# Patient Record
Sex: FEMALE | Race: WHITE | NOT HISPANIC OR LATINO
[De-identification: names, ages, dates, MRNs, and addresses within clinical notes are randomized per-mention and may not be internally consistent; named-entity substitution may affect disease eponyms.]

---

## 2018-11-15 ENCOUNTER — APPOINTMENT (OUTPATIENT)
Dept: INTERNAL MEDICINE | Facility: CLINIC | Age: 68
End: 2018-11-15
Payer: MEDICARE

## 2018-11-15 VITALS
HEIGHT: 64 IN | HEART RATE: 84 BPM | TEMPERATURE: 98 F | BODY MASS INDEX: 23.73 KG/M2 | WEIGHT: 139 LBS | SYSTOLIC BLOOD PRESSURE: 132 MMHG | RESPIRATION RATE: 14 BRPM | DIASTOLIC BLOOD PRESSURE: 70 MMHG | OXYGEN SATURATION: 96 %

## 2018-11-15 DIAGNOSIS — Z00.00 ENCOUNTER FOR GENERAL ADULT MEDICAL EXAMINATION W/OUT ABNORMAL FINDINGS: ICD-10-CM

## 2018-11-15 DIAGNOSIS — Z87.891 PERSONAL HISTORY OF NICOTINE DEPENDENCE: ICD-10-CM

## 2018-11-15 DIAGNOSIS — M25.561 PAIN IN RIGHT KNEE: ICD-10-CM

## 2018-11-15 DIAGNOSIS — F51.01 PRIMARY INSOMNIA: ICD-10-CM

## 2018-11-15 DIAGNOSIS — Z78.9 OTHER SPECIFIED HEALTH STATUS: ICD-10-CM

## 2018-11-15 DIAGNOSIS — F41.9 ANXIETY DISORDER, UNSPECIFIED: ICD-10-CM

## 2018-11-15 DIAGNOSIS — F32.9 ANXIETY DISORDER, UNSPECIFIED: ICD-10-CM

## 2018-11-15 PROCEDURE — 99203 OFFICE O/P NEW LOW 30 MIN: CPT

## 2018-11-15 PROCEDURE — 36415 COLL VENOUS BLD VENIPUNCTURE: CPT

## 2018-11-15 PROCEDURE — 93000 ELECTROCARDIOGRAM COMPLETE: CPT

## 2018-11-15 NOTE — HISTORY OF PRESENT ILLNESS
[FreeTextEntry1] : right TKR [FreeTextEntry2] : 11/28 [FreeTextEntry3] : Dr Scales [FreeTextEntry4] : This is a 67 yo f with essential tremor, severe right knee pain with plan for right TKR.  \par Normally walks a lot but limited due to knee pain.  \par Takes both paxil and ambien daily.\par She is from [place] but here for treatment for her  who has pancreatic cancer

## 2018-11-16 LAB
ALBUMIN SERPL ELPH-MCNC: 4.6 G/DL
ALP BLD-CCNC: 74 U/L
ALT SERPL-CCNC: 8 U/L
ANION GAP SERPL CALC-SCNC: 13 MMOL/L
APPEARANCE: ABNORMAL
APTT BLD: 32.6 SEC
AST SERPL-CCNC: 14 U/L
BASOPHILS # BLD AUTO: 0.07 K/UL
BASOPHILS NFR BLD AUTO: 0.8 %
BILIRUB SERPL-MCNC: <0.2 MG/DL
BILIRUBIN URINE: NEGATIVE
BLOOD URINE: NEGATIVE
BUN SERPL-MCNC: 17 MG/DL
CALCIUM SERPL-MCNC: 9.7 MG/DL
CHLORIDE SERPL-SCNC: 104 MMOL/L
CO2 SERPL-SCNC: 24 MMOL/L
COLOR: YELLOW
CREAT SERPL-MCNC: 0.7 MG/DL
EOSINOPHIL # BLD AUTO: 0.46 K/UL
EOSINOPHIL NFR BLD AUTO: 5.4 %
GLUCOSE QUALITATIVE U: NEGATIVE
GLUCOSE SERPL-MCNC: 97 MG/DL
HCT VFR BLD CALC: 41.9 %
HGB BLD-MCNC: 13.4 G/DL
IMM GRANULOCYTES NFR BLD AUTO: 0.2 %
INR PPP: 0.91 RATIO
KETONES URINE: NEGATIVE
LEUKOCYTE ESTERASE URINE: NEGATIVE
LYMPHOCYTES # BLD AUTO: 1.99 K/UL
LYMPHOCYTES NFR BLD AUTO: 23.2 %
MAN DIFF?: NORMAL
MCHC RBC-ENTMCNC: 31.2 PG
MCHC RBC-ENTMCNC: 32 GM/DL
MCV RBC AUTO: 97.4 FL
MONOCYTES # BLD AUTO: 0.63 K/UL
MONOCYTES NFR BLD AUTO: 7.4 %
NEUTROPHILS # BLD AUTO: 5.4 K/UL
NEUTROPHILS NFR BLD AUTO: 63 %
NITRITE URINE: NEGATIVE
PH URINE: 6
PLATELET # BLD AUTO: 312 K/UL
POTASSIUM SERPL-SCNC: 4.2 MMOL/L
PROT SERPL-MCNC: 6.9 G/DL
PROTEIN URINE: 100
PT BLD: 10.2 SEC
RBC # BLD: 4.3 M/UL
RBC # FLD: 13.4 %
SODIUM SERPL-SCNC: 141 MMOL/L
SPECIFIC GRAVITY URINE: 1.02
UROBILINOGEN URINE: NEGATIVE
WBC # FLD AUTO: 8.57 K/UL

## 2018-11-21 ENCOUNTER — OUTPATIENT (OUTPATIENT)
Dept: OUTPATIENT SERVICES | Facility: HOSPITAL | Age: 68
LOS: 1 days | End: 2018-11-21
Payer: COMMERCIAL

## 2018-11-21 DIAGNOSIS — Z22.321 CARRIER OR SUSPECTED CARRIER OF METHICILLIN SUSCEPTIBLE STAPHYLOCOCCUS AUREUS: ICD-10-CM

## 2018-11-21 LAB
MRSA PCR RESULT.: NEGATIVE — SIGNIFICANT CHANGE UP
S AUREUS DNA NOSE QL NAA+PROBE: NEGATIVE — SIGNIFICANT CHANGE UP

## 2018-11-21 PROCEDURE — 87641 MR-STAPH DNA AMP PROBE: CPT

## 2018-11-28 ENCOUNTER — TRANSCRIPTION ENCOUNTER (OUTPATIENT)
Age: 68
End: 2018-11-28

## 2018-11-28 ENCOUNTER — RESULT REVIEW (OUTPATIENT)
Age: 68
End: 2018-11-28

## 2018-11-28 ENCOUNTER — INPATIENT (INPATIENT)
Facility: HOSPITAL | Age: 68
LOS: 1 days | Discharge: HOME | DRG: 470 | End: 2018-11-30
Payer: MEDICARE

## 2018-11-28 VITALS
RESPIRATION RATE: 16 BRPM | SYSTOLIC BLOOD PRESSURE: 141 MMHG | OXYGEN SATURATION: 100 % | TEMPERATURE: 97 F | DIASTOLIC BLOOD PRESSURE: 62 MMHG | HEART RATE: 76 BPM

## 2018-11-28 DIAGNOSIS — E78.5 HYPERLIPIDEMIA, UNSPECIFIED: ICD-10-CM

## 2018-11-28 DIAGNOSIS — F32.9 MAJOR DEPRESSIVE DISORDER, SINGLE EPISODE, UNSPECIFIED: ICD-10-CM

## 2018-11-28 DIAGNOSIS — Z90.89 ACQUIRED ABSENCE OF OTHER ORGANS: Chronic | ICD-10-CM

## 2018-11-28 DIAGNOSIS — M19.90 UNSPECIFIED OSTEOARTHRITIS, UNSPECIFIED SITE: ICD-10-CM

## 2018-11-28 DIAGNOSIS — Z90.49 ACQUIRED ABSENCE OF OTHER SPECIFIED PARTS OF DIGESTIVE TRACT: Chronic | ICD-10-CM

## 2018-11-28 DIAGNOSIS — M25.561 PAIN IN RIGHT KNEE: ICD-10-CM

## 2018-11-28 DIAGNOSIS — Z90.710 ACQUIRED ABSENCE OF BOTH CERVIX AND UTERUS: Chronic | ICD-10-CM

## 2018-11-28 DIAGNOSIS — M17.11 UNILATERAL PRIMARY OSTEOARTHRITIS, RIGHT KNEE: ICD-10-CM

## 2018-11-28 DIAGNOSIS — Z98.890 OTHER SPECIFIED POSTPROCEDURAL STATES: Chronic | ICD-10-CM

## 2018-11-28 PROCEDURE — 73560 X-RAY EXAM OF KNEE 1 OR 2: CPT | Mod: 26,RT

## 2018-11-28 RX ORDER — MORPHINE SULFATE 50 MG/1
4 CAPSULE, EXTENDED RELEASE ORAL EVERY 4 HOURS
Qty: 0 | Refills: 0 | Status: DISCONTINUED | OUTPATIENT
Start: 2018-11-28 | End: 2018-11-29

## 2018-11-28 RX ORDER — OXYCODONE AND ACETAMINOPHEN 5; 325 MG/1; MG/1
2 TABLET ORAL EVERY 6 HOURS
Qty: 0 | Refills: 0 | Status: DISCONTINUED | OUTPATIENT
Start: 2018-11-28 | End: 2018-11-29

## 2018-11-28 RX ORDER — ONDANSETRON 8 MG/1
4 TABLET, FILM COATED ORAL EVERY 6 HOURS
Qty: 0 | Refills: 0 | Status: DISCONTINUED | OUTPATIENT
Start: 2018-11-28 | End: 2018-11-29

## 2018-11-28 RX ORDER — ASPIRIN/CALCIUM CARB/MAGNESIUM 324 MG
325 TABLET ORAL
Qty: 0 | Refills: 0 | Status: DISCONTINUED | OUTPATIENT
Start: 2018-11-28 | End: 2018-11-30

## 2018-11-28 RX ORDER — OXYCODONE HYDROCHLORIDE 5 MG/1
10 TABLET ORAL EVERY 4 HOURS
Qty: 0 | Refills: 0 | Status: DISCONTINUED | OUTPATIENT
Start: 2018-11-28 | End: 2018-11-29

## 2018-11-28 RX ORDER — SODIUM CHLORIDE 9 MG/ML
1000 INJECTION, SOLUTION INTRAVENOUS
Qty: 0 | Refills: 0 | Status: DISCONTINUED | OUTPATIENT
Start: 2018-11-28 | End: 2018-11-30

## 2018-11-28 RX ORDER — ACETAMINOPHEN 500 MG
650 TABLET ORAL EVERY 6 HOURS
Qty: 0 | Refills: 0 | Status: DISCONTINUED | OUTPATIENT
Start: 2018-11-28 | End: 2018-11-30

## 2018-11-28 RX ORDER — BUPIVACAINE 13.3 MG/ML
20 INJECTION, SUSPENSION, LIPOSOMAL INFILTRATION ONCE
Qty: 0 | Refills: 0 | Status: DISCONTINUED | OUTPATIENT
Start: 2018-11-28 | End: 2018-11-30

## 2018-11-28 RX ORDER — CEFAZOLIN SODIUM 1 G
1000 VIAL (EA) INJECTION EVERY 8 HOURS
Qty: 0 | Refills: 0 | Status: COMPLETED | OUTPATIENT
Start: 2018-11-28 | End: 2018-11-29

## 2018-11-28 RX ORDER — ZOLPIDEM TARTRATE 10 MG/1
5 TABLET ORAL ONCE
Qty: 0 | Refills: 0 | Status: DISCONTINUED | OUTPATIENT
Start: 2018-11-28 | End: 2018-11-29

## 2018-11-28 RX ORDER — MORPHINE SULFATE 50 MG/1
4 CAPSULE, EXTENDED RELEASE ORAL
Qty: 0 | Refills: 0 | Status: DISCONTINUED | OUTPATIENT
Start: 2018-11-28 | End: 2018-11-29

## 2018-11-28 RX ORDER — OXYCODONE HYDROCHLORIDE 5 MG/1
5 TABLET ORAL EVERY 4 HOURS
Qty: 0 | Refills: 0 | Status: DISCONTINUED | OUTPATIENT
Start: 2018-11-28 | End: 2018-11-29

## 2018-11-28 RX ADMIN — OXYCODONE HYDROCHLORIDE 5 MILLIGRAM(S): 5 TABLET ORAL at 22:45

## 2018-11-28 RX ADMIN — OXYCODONE HYDROCHLORIDE 5 MILLIGRAM(S): 5 TABLET ORAL at 21:52

## 2018-11-28 RX ADMIN — OXYCODONE HYDROCHLORIDE 5 MILLIGRAM(S): 5 TABLET ORAL at 18:52

## 2018-11-28 RX ADMIN — Medication 100 MILLIGRAM(S): at 21:02

## 2018-11-28 RX ADMIN — Medication 325 MILLIGRAM(S): at 17:52

## 2018-11-28 RX ADMIN — OXYCODONE HYDROCHLORIDE 5 MILLIGRAM(S): 5 TABLET ORAL at 17:52

## 2018-11-28 NOTE — PHYSICAL THERAPY INITIAL EVALUATION ADULT - GENERAL OBSERVATIONS, REHAB EVAL
Received supine complaints of R knee pain 4/10 +EKG, IV hep, R cryocuff, B SCD. Left as found +RN Chata aware

## 2018-11-28 NOTE — DISCHARGE NOTE ADULT - MEDICATION SUMMARY - MEDICATIONS TO TAKE
I will START or STAY ON the medications listed below when I get home from the hospital:    oxyCODONE 5 mg oral capsule  -- 1 -2 cap(s) by mouth every 4 to 6 hours, As Needed -for moderate pain MDD:8   -- Caution federal law prohibits the transfer of this drug to any person other  than the person for whom it was prescribed.  It is very important that you take or use this exactly as directed.  Do not skip doses or discontinue unless directed by your doctor.  May cause drowsiness.  Alcohol may intensify this effect.  Use care when operating dangerous machinery.  This prescription cannot be refilled.  Using more of this medication than prescribed may cause serious breathing problems.    -- Indication: For pain control    Paxil 20 mg oral tablet  -- 1 tab(s) by mouth once a day  -- Indication: For Home medication    Ambien 10 mg oral tablet  -- 1 tab(s) by mouth once a day (at bedtime)  -- Indication: For Home medication

## 2018-11-28 NOTE — PROGRESS NOTE ADULT - SUBJECTIVE AND OBJECTIVE BOX
Orthopedics Post Op Check    Procedure: RIGHT TKR  Surgeon: KYLIE Mireles. stable, laying in bed comfortably. Denies CP, SOB, N/V, numbness/tingling in b/l les.     Vital Signs Last 24 Hrs  T(C): 36.2 (28 Nov 2018 13:32), Max: 36.2 (28 Nov 2018 13:32)  T(F): 97.2 (28 Nov 2018 13:32), Max: 97.2 (28 Nov 2018 13:32)  HR: 70 (28 Nov 2018 15:02) (66 - 76)  BP: 150/67 (28 Nov 2018 15:02) (130/60 - 152/67)  BP(mean): 96 (28 Nov 2018 15:02) (87 - 98)  RR: 20 (28 Nov 2018 15:02) (12 - 20)  SpO2: 100% (28 Nov 2018 15:02) (100% - 100%)      Dressing C/D/I     Pulses: DP/PT 2+B/L LES  SLT:intact right >left  B/L LES  Motor:  EHL/FHL/TA/GS  5/5 b/l les      Post op XR:prosthesis in place     A/P: 68yoFemale POD#0 s/p right tkr  - Stable  - Pain Control  - DVT ppx: asa  - Post op abx:ancef  - PT, WBS: wbat   - F/U AM Labs  - f/u sensation b/l les

## 2018-11-28 NOTE — DISCHARGE NOTE ADULT - PATIENT PORTAL LINK FT
You can access the [facility] Patient Portal, offered by [facility], by registering with the following website: [email or web address]

## 2018-11-28 NOTE — PHYSICAL THERAPY INITIAL EVALUATION ADULT - PERTINENT HX OF CURRENT PROBLEM, REHAB EVAL
67yo f right knee pain x 5y. Pt. denies any injury but states she has arthritis. Pt. reports she had right knee arthroscopy done in 2015 secondary to right knee meniscus tear.  Pt. still had pain afterwards due to the fact she had no cartilage left. Pt. c/o pain with walking and standing. Pt. takes Tylenol and Advil for pain relief.

## 2018-11-28 NOTE — PROGRESS NOTE ADULT - SUBJECTIVE AND OBJECTIVE BOX
Ortho Post Op Check    Procedure: right total knee replacement  Surgeon: Dr. Scales    Pt comfortable without complaints, pain controlled. States she had an episode of urinary incontinence earlier leading to dudley placement.   Denies CP, SOB, N/V, numbness/tingling    Vital Signs Last 24 Hrs  T(C): 36.3 (11-28-18 @ 15:59), Max: 36.3 (11-28-18 @ 15:59)  T(F): 97.3 (11-28-18 @ 15:59), Max: 97.3 (11-28-18 @ 15:59)  HR: 68 (11-28-18 @ 15:32) (66 - 76)  BP: 152/68 (11-28-18 @ 15:32) (130/60 - 152/68)  BP(mean): 98 (11-28-18 @ 15:32) (87 - 98)  RR: 12 (11-28-18 @ 15:32) (12 - 20)  SpO2: 100% (11-28-18 @ 15:32) (100% - 100%)      General: Pt Alert and oriented, NAD  DSG C/D/I right knee  Pulses: DP pulse 2+ right lower extremity  Sensation: intact and equal to bilateral lower extremities  Motor: EHL/FHL/TA/GS 5/5 bilateral lower extremities     Post-op X-Ray: prosthesis in place    A/P: 68yFemale POD#0 s/p right total knee replacement   - Stable  - Pain Control  - DVT ppx:  - Post op abx:  - PT, WBS: WBAT    Ortho Pager [phone] Ortho Post Op Check    Procedure: right total knee replacement  Surgeon: Dr. Scales    Pt comfortable without complaints, pain controlled. States she had an episode of urinary incontinence earlier leading to dudley placement.   Denies CP, SOB, N/V, numbness/tingling    Vital Signs Last 24 Hrs  T(C): 36.3 (11-28-18 @ 15:59), Max: 36.3 (11-28-18 @ 15:59)  T(F): 97.3 (11-28-18 @ 15:59), Max: 97.3 (11-28-18 @ 15:59)  HR: 68 (11-28-18 @ 15:32) (66 - 76)  BP: 152/68 (11-28-18 @ 15:32) (130/60 - 152/68)  BP(mean): 98 (11-28-18 @ 15:32) (87 - 98)  RR: 12 (11-28-18 @ 15:32) (12 - 20)  SpO2: 100% (11-28-18 @ 15:32) (100% - 100%)      General: Pt Alert and oriented, NAD  DSG C/D/I right knee  Pulses: DP pulse 2+ right lower extremity  Sensation: intact and equal to bilateral lower extremities  Motor: EHL/FHL/TA/GS 5/5 bilateral lower extremities     Post-op X-Ray: prosthesis in place    A/P: 68yFemale POD#0 s/p right total knee replacement   - Stable  - Pain Control  - DVT ppx: ASA  - Post op abx: ancef  - PT, WBS: WBAT  - f/u AM labs    Ortho Pager [phone]

## 2018-11-28 NOTE — DISCHARGE NOTE ADULT - CARE PLAN
Principal Discharge DX:	Osteoarthritis  Goal:	improvement s/p Right TKR  Assessment and plan of treatment:	Weight bear as tolerated with assistive device.  No strenuous activity, heavy lifting, driving or returning to work until cleared by MD.  You may shower - dressing is water-resistant, no soaking in bathtubs.  Remove dressing after post op day 5-7, then leave incision open to air. Keep incision clean and dry.  Try to have regular bowel movements, take stool softener or laxative if necessary.  May take Pepcid or Zantac for upset stomach.  Swelling may travel all the way down leg to foot, this is normal and will subside in a few weeks.  Call to schedule an appt with Dr. Scales for follow up, if you have staples or sutures they will be removed in office.  Contact your doctor if you experience: fever greater than 101.5, chills, chest pain, difficulty breathing, redness or excessive drainage around the incision, other concerns.  Follow up with your primary care provider.

## 2018-11-28 NOTE — H&P ADULT - NSHPLABSRESULTS_GEN_ALL_CORE
preop cbc/bmp/coags/ua wnl per medical clearance   EKG-SINUS RHYTHM, MAKRED LAD  CXR- wnl per medical clearance

## 2018-11-28 NOTE — PHYSICAL THERAPY INITIAL EVALUATION ADULT - ADDITIONAL COMMENTS
daughter's apartment has 1 flight of stairs inside to main bedroom. Handrail going up on L. No steps to enter, independent prior to arrival

## 2018-11-28 NOTE — H&P ADULT - HISTORY OF PRESENT ILLNESS
67yo f right knee pain x 5y. Pt. denies any injury but states she has arthritis. Pt. reports she had right knee arthroscopy done in 2015 secondary to right knee meniscus tear.  Pt. still had pain afterwards due to the fact she had no cartilage left. Pt. c/o pain with walking and standing. Pt. takes Tylenol and Advil for pain relief. Denies any numbness/tingling in b/l les. Denies any walker assistance. Presents today for elective RIGHT TKR.

## 2018-11-28 NOTE — H&P ADULT - PSH
H/O knee surgery  right  H/O shoulder surgery  right rotator  H/O: hysterectomy    History of appendectomy    History of tonsillectomy

## 2018-11-28 NOTE — PHYSICAL THERAPY INITIAL EVALUATION ADULT - CRITERIA FOR SKILLED THERAPEUTIC INTERVENTIONS
impairments found/therapy frequency/functional limitations in following categories/anticipated discharge recommendation/rehab potential/anticipated equipment needs at discharge

## 2018-11-28 NOTE — DISCHARGE NOTE ADULT - PLAN OF CARE
improvement s/p Right TKR Weight bear as tolerated with assistive device.  No strenuous activity, heavy lifting, driving or returning to work until cleared by MD.  You may shower - dressing is water-resistant, no soaking in bathtubs.  Remove dressing after post op day 5-7, then leave incision open to air. Keep incision clean and dry.  Try to have regular bowel movements, take stool softener or laxative if necessary.  May take Pepcid or Zantac for upset stomach.  Swelling may travel all the way down leg to foot, this is normal and will subside in a few weeks.  Call to schedule an appt with Dr. Scales for follow up, if you have staples or sutures they will be removed in office.  Contact your doctor if you experience: fever greater than 101.5, chills, chest pain, difficulty breathing, redness or excessive drainage around the incision, other concerns.  Follow up with your primary care provider.

## 2018-11-28 NOTE — DISCHARGE NOTE ADULT - HOME CARE AGENCY
[facility]  [phone] [facility]  [phone]  Medicare Complete Joint Replacemnt Program  [facility]  [phone]

## 2018-11-28 NOTE — H&P ADULT - NSHPPHYSICALEXAM_GEN_ALL_CORE
GENERAL- NAD  MSK- RIGHT KNEE SKIN INTACT, SLT INTACT, DP/PT 2+, EHL/TA/GS 5/5. DECREASED ROM SECONDARY TO PAIN. REST OF PE PER MEDICAL CLEARANCE.

## 2018-11-28 NOTE — DISCHARGE NOTE ADULT - CARE PROVIDER_API CALL
Vincent Scales), Orthopaedic Surgery  [address]  [address] [place] [address]  Phone: [phone]  Fax: [phone]

## 2018-11-29 LAB
ANION GAP SERPL CALC-SCNC: 10 MMOL/L — SIGNIFICANT CHANGE UP (ref 5–17)
BASOPHILS NFR BLD AUTO: 0.1 % — SIGNIFICANT CHANGE UP (ref 0–2)
BUN SERPL-MCNC: 12 MG/DL — SIGNIFICANT CHANGE UP (ref 7–23)
CALCIUM SERPL-MCNC: 8.8 MG/DL — SIGNIFICANT CHANGE UP (ref 8.4–10.5)
CHLORIDE SERPL-SCNC: 101 MMOL/L — SIGNIFICANT CHANGE UP (ref 96–108)
CO2 SERPL-SCNC: 26 MMOL/L — SIGNIFICANT CHANGE UP (ref 22–31)
CREAT SERPL-MCNC: 0.63 MG/DL — SIGNIFICANT CHANGE UP (ref 0.5–1.3)
EOSINOPHIL NFR BLD AUTO: 0.1 % — SIGNIFICANT CHANGE UP (ref 0–6)
GLUCOSE SERPL-MCNC: 120 MG/DL — HIGH (ref 70–99)
HCT VFR BLD CALC: 32.3 % — LOW (ref 34.5–45)
HGB BLD-MCNC: 10.2 G/DL — LOW (ref 11.5–15.5)
LYMPHOCYTES # BLD AUTO: 7.3 % — LOW (ref 13–44)
MCHC RBC-ENTMCNC: 29.8 PG — SIGNIFICANT CHANGE UP (ref 27–34)
MCHC RBC-ENTMCNC: 31.6 G/DL — LOW (ref 32–36)
MCV RBC AUTO: 94.4 FL — SIGNIFICANT CHANGE UP (ref 80–100)
MONOCYTES NFR BLD AUTO: 5.1 % — SIGNIFICANT CHANGE UP (ref 2–14)
NEUTROPHILS NFR BLD AUTO: 87.4 % — HIGH (ref 43–77)
PLATELET # BLD AUTO: 264 K/UL — SIGNIFICANT CHANGE UP (ref 150–400)
POTASSIUM SERPL-MCNC: 4.1 MMOL/L — SIGNIFICANT CHANGE UP (ref 3.5–5.3)
POTASSIUM SERPL-SCNC: 4.1 MMOL/L — SIGNIFICANT CHANGE UP (ref 3.5–5.3)
RBC # BLD: 3.42 M/UL — LOW (ref 3.8–5.2)
RBC # FLD: 12.5 % — SIGNIFICANT CHANGE UP (ref 10.3–16.9)
SODIUM SERPL-SCNC: 137 MMOL/L — SIGNIFICANT CHANGE UP (ref 135–145)
WBC # BLD: 17.3 K/UL — HIGH (ref 3.8–10.5)
WBC # FLD AUTO: 17.3 K/UL — HIGH (ref 3.8–10.5)

## 2018-11-29 RX ORDER — KETOROLAC TROMETHAMINE 30 MG/ML
15 SYRINGE (ML) INJECTION EVERY 6 HOURS
Qty: 0 | Refills: 0 | Status: DISCONTINUED | OUTPATIENT
Start: 2018-11-29 | End: 2018-11-30

## 2018-11-29 RX ORDER — TRAMADOL HYDROCHLORIDE 50 MG/1
25 TABLET ORAL EVERY 4 HOURS
Qty: 0 | Refills: 0 | Status: DISCONTINUED | OUTPATIENT
Start: 2018-11-29 | End: 2018-11-30

## 2018-11-29 RX ORDER — ZALEPLON 10 MG
5 CAPSULE ORAL AT BEDTIME
Qty: 0 | Refills: 0 | Status: DISCONTINUED | OUTPATIENT
Start: 2018-11-29 | End: 2018-11-30

## 2018-11-29 RX ORDER — ONDANSETRON 8 MG/1
4 TABLET, FILM COATED ORAL EVERY 6 HOURS
Qty: 0 | Refills: 0 | Status: DISCONTINUED | OUTPATIENT
Start: 2018-11-29 | End: 2018-11-30

## 2018-11-29 RX ORDER — CELECOXIB 200 MG/1
200 CAPSULE ORAL
Qty: 0 | Refills: 0 | Status: DISCONTINUED | OUTPATIENT
Start: 2018-11-29 | End: 2018-11-30

## 2018-11-29 RX ORDER — TRAMADOL HYDROCHLORIDE 50 MG/1
50 TABLET ORAL EVERY 4 HOURS
Qty: 0 | Refills: 0 | Status: DISCONTINUED | OUTPATIENT
Start: 2018-11-29 | End: 2018-11-30

## 2018-11-29 RX ORDER — FAMOTIDINE 10 MG/ML
20 INJECTION INTRAVENOUS
Qty: 0 | Refills: 0 | Status: DISCONTINUED | OUTPATIENT
Start: 2018-11-29 | End: 2018-11-30

## 2018-11-29 RX ADMIN — Medication 100 MILLIGRAM(S): at 05:24

## 2018-11-29 RX ADMIN — Medication 650 MILLIGRAM(S): at 09:46

## 2018-11-29 RX ADMIN — TRAMADOL HYDROCHLORIDE 50 MILLIGRAM(S): 50 TABLET ORAL at 22:48

## 2018-11-29 RX ADMIN — TRAMADOL HYDROCHLORIDE 50 MILLIGRAM(S): 50 TABLET ORAL at 19:37

## 2018-11-29 RX ADMIN — CELECOXIB 200 MILLIGRAM(S): 200 CAPSULE ORAL at 18:34

## 2018-11-29 RX ADMIN — SODIUM CHLORIDE 100 MILLILITER(S): 9 INJECTION, SOLUTION INTRAVENOUS at 03:30

## 2018-11-29 RX ADMIN — Medication 325 MILLIGRAM(S): at 05:24

## 2018-11-29 RX ADMIN — CELECOXIB 200 MILLIGRAM(S): 200 CAPSULE ORAL at 19:34

## 2018-11-29 RX ADMIN — Medication 15 MILLIGRAM(S): at 19:42

## 2018-11-29 RX ADMIN — Medication 650 MILLIGRAM(S): at 10:46

## 2018-11-29 RX ADMIN — TRAMADOL HYDROCHLORIDE 50 MILLIGRAM(S): 50 TABLET ORAL at 23:48

## 2018-11-29 RX ADMIN — Medication 650 MILLIGRAM(S): at 03:16

## 2018-11-29 RX ADMIN — Medication 20 MILLIGRAM(S): at 11:05

## 2018-11-29 RX ADMIN — Medication 5 MILLIGRAM(S): at 23:47

## 2018-11-29 RX ADMIN — FAMOTIDINE 20 MILLIGRAM(S): 10 INJECTION INTRAVENOUS at 18:34

## 2018-11-29 RX ADMIN — Medication 650 MILLIGRAM(S): at 04:00

## 2018-11-29 RX ADMIN — Medication 15 MILLIGRAM(S): at 19:57

## 2018-11-29 RX ADMIN — ZOLPIDEM TARTRATE 5 MILLIGRAM(S): 10 TABLET ORAL at 00:47

## 2018-11-29 RX ADMIN — Medication 15 MILLIGRAM(S): at 11:57

## 2018-11-29 RX ADMIN — Medication 15 MILLIGRAM(S): at 10:57

## 2018-11-29 RX ADMIN — TRAMADOL HYDROCHLORIDE 50 MILLIGRAM(S): 50 TABLET ORAL at 18:37

## 2018-11-29 RX ADMIN — Medication 325 MILLIGRAM(S): at 18:34

## 2018-11-29 NOTE — PROGRESS NOTE ADULT - SUBJECTIVE AND OBJECTIVE BOX
Ortho Note    Pt comfortable without complaints, pain controlled  Denies CP, SOB, N/V, numbness/tingling     Vital Signs Last 24 Hrs  T(C): 37.2 (11-29-18 @ 17:11), Max: 37.2 (11-29-18 @ 17:11)  T(F): 98.9 (11-29-18 @ 17:11), Max: 98.9 (11-29-18 @ 17:11)  HR: 75 (11-29-18 @ 17:11) (75 - 75)  BP: 113/57 (11-29-18 @ 17:11) (113/57 - 113/57)  BP(mean): --  RR: 16 (11-29-18 @ 17:11) (16 - 16)  SpO2: 96% (11-29-18 @ 17:11) (96% - 96%)    General: Pt Alert and oriented, NAD  DSG C/D/I right knee  Pulses intact RLE  Sensation intact RLE  Motor: EHL/FHL/TA/GS 5/5 RLE                          10.2   17.3  )-----------( 264      ( 29 Nov 2018 06:49 )             32.3   29 Nov 2018 06:49    137    |  101    |  12     ----------------------------<  120    4.1     |  26     |  0.63           A/P: 68yFemale POD#1 s/p Right TKR  - Stable  - Pain Control  - DVT ppx: asa  - PT, WBS: wbat    Ortho Pager [phone]

## 2018-11-29 NOTE — PROGRESS NOTE ADULT - SUBJECTIVE AND OBJECTIVE BOX
S: No acute events overnight.    Vital Signs Last 24 Hrs  T(C): 36.4 (29 Nov 2018 08:30), Max: 37.4 (29 Nov 2018 04:30)  T(F): 97.5 (29 Nov 2018 08:30), Max: 99.3 (29 Nov 2018 04:30)  HR: 82 (29 Nov 2018 08:30) (66 - 98)  BP: 126/59 (29 Nov 2018 08:30) (103/64 - 152/68)  BP(mean): 92 (28 Nov 2018 16:26) (87 - 98)  RR: 16 (29 Nov 2018 08:30) (12 - 20)  SpO2: 98% (29 Nov 2018 08:30) (97% - 100%)  I&O's Summary    28 Nov 2018 07:01  -  29 Nov 2018 07:00  --------------------------------------------------------  IN: 2260 mL / OUT: 2150 mL / NET: 110 mL    29 Nov 2018 07:01  -  29 Nov 2018 12:00  --------------------------------------------------------  IN: 420 mL / OUT: 1200 mL / NET: -780 mL        Dressing CDI  Motor: 5/5 GS/TA/EHL/FHL    Sensation: SILT sural, saph, DP, SP and tibial n distribution  Pulses: WWP, DP/PT 2+                            10.2   17.3  )-----------( 264      ( 29 Nov 2018 06:49 )             32.3     11-29    137  |  101  |  12  ----------------------------<  120<H>  4.1   |  26  |  0.63    Ca    8.8      29 Nov 2018 06:49        MEDICATIONS  (STANDING):  aspirin enteric coated 325 milliGRAM(s) Oral two times a day  BUpivacaine liposome 1.3% Injectable (no eMAR) 20 milliLiter(s) Local Injection once  celecoxib 200 milliGRAM(s) Oral two times a day  famotidine    Tablet 20 milliGRAM(s) Oral two times a day  lactated ringers. 1000 milliLiter(s) (100 mL/Hr) IV Continuous <Continuous>  PARoxetine 20 milliGRAM(s) Oral daily    MEDICATIONS  (PRN):  acetaminophen   Tablet .. 650 milliGRAM(s) Oral every 6 hours PRN Mild Pain (1 - 3)  ketorolac   Injectable 15 milliGRAM(s) IV Push every 6 hours PRN breakthrough pain  ondansetron Injectable 4 milliGRAM(s) IV Push every 6 hours PRN Nausea and/or Vomiting  traMADol 25 milliGRAM(s) Oral every 4 hours PRN Moderate Pain (4 - 6)  traMADol 50 milliGRAM(s) Oral every 4 hours PRN Severe Pain (7 - 10)      A/P:  68y Female s/p R TKA on 11/28  -Stable  -Pain Control  -PT/WBAT  -DVT ppx: ASA  -Advance diet as tolerated  -f/u AM labs  -Dispo: pending

## 2018-11-30 VITALS
SYSTOLIC BLOOD PRESSURE: 122 MMHG | HEART RATE: 82 BPM | OXYGEN SATURATION: 97 % | DIASTOLIC BLOOD PRESSURE: 62 MMHG | TEMPERATURE: 98 F | RESPIRATION RATE: 16 BRPM

## 2018-11-30 RX ORDER — ACETAMINOPHEN 500 MG
1000 TABLET ORAL ONCE
Qty: 0 | Refills: 0 | Status: COMPLETED | OUTPATIENT
Start: 2018-11-30 | End: 2018-11-30

## 2018-11-30 RX ORDER — OXYCODONE HYDROCHLORIDE 5 MG/1
10 TABLET ORAL EVERY 4 HOURS
Qty: 0 | Refills: 0 | Status: DISCONTINUED | OUTPATIENT
Start: 2018-11-30 | End: 2018-11-30

## 2018-11-30 RX ORDER — OXYCODONE HYDROCHLORIDE 5 MG/1
1 TABLET ORAL
Qty: 54 | Refills: 0 | OUTPATIENT
Start: 2018-11-30 | End: 2018-12-08

## 2018-11-30 RX ORDER — OXYCODONE HYDROCHLORIDE 5 MG/1
5 TABLET ORAL EVERY 4 HOURS
Qty: 0 | Refills: 0 | Status: DISCONTINUED | OUTPATIENT
Start: 2018-11-30 | End: 2018-11-30

## 2018-11-30 RX ADMIN — Medication 15 MILLIGRAM(S): at 04:55

## 2018-11-30 RX ADMIN — Medication 400 MILLIGRAM(S): at 07:20

## 2018-11-30 RX ADMIN — Medication 1000 MILLIGRAM(S): at 07:35

## 2018-11-30 RX ADMIN — Medication 15 MILLIGRAM(S): at 04:40

## 2018-11-30 RX ADMIN — FAMOTIDINE 20 MILLIGRAM(S): 10 INJECTION INTRAVENOUS at 05:34

## 2018-11-30 RX ADMIN — TRAMADOL HYDROCHLORIDE 50 MILLIGRAM(S): 50 TABLET ORAL at 04:39

## 2018-11-30 RX ADMIN — CELECOXIB 200 MILLIGRAM(S): 200 CAPSULE ORAL at 05:34

## 2018-11-30 RX ADMIN — ONDANSETRON 4 MILLIGRAM(S): 8 TABLET, FILM COATED ORAL at 04:10

## 2018-11-30 RX ADMIN — Medication 325 MILLIGRAM(S): at 05:34

## 2018-11-30 RX ADMIN — TRAMADOL HYDROCHLORIDE 50 MILLIGRAM(S): 50 TABLET ORAL at 03:39

## 2018-11-30 RX ADMIN — OXYCODONE HYDROCHLORIDE 10 MILLIGRAM(S): 5 TABLET ORAL at 10:30

## 2018-11-30 RX ADMIN — CELECOXIB 200 MILLIGRAM(S): 200 CAPSULE ORAL at 06:10

## 2018-11-30 RX ADMIN — OXYCODONE HYDROCHLORIDE 10 MILLIGRAM(S): 5 TABLET ORAL at 09:51

## 2018-11-30 NOTE — PROGRESS NOTE ADULT - SUBJECTIVE AND OBJECTIVE BOX
S: Patient in pain this AM.    Vital Signs Last 24 Hrs  T(C): 37.2 (29 Nov 2018 20:15), Max: 37.2 (29 Nov 2018 17:11)  T(F): 99 (29 Nov 2018 20:15), Max: 99 (29 Nov 2018 20:15)  HR: 89 (29 Nov 2018 20:15) (75 - 89)  BP: 124/72 (29 Nov 2018 20:15) (113/57 - 126/59)  BP(mean): --  RR: 16 (29 Nov 2018 20:15) (16 - 16)  SpO2: 96% (29 Nov 2018 20:15) (96% - 98%)      I&O's Summary    28 Nov 2018 07:01  -  29 Nov 2018 07:00  --------------------------------------------------------  IN: 2260 mL / OUT: 2150 mL / NET: 110 mL    29 Nov 2018 07:01  -  29 Nov 2018 12:00  --------------------------------------------------------  IN: 420 mL / OUT: 1200 mL / NET: -780 mL        Dressing CDI  Motor: 5/5 GS/TA/EHL/FHL    Sensation: SILT sural, saph, DP, SP and tibial n distribution  Pulses: WWP, DP/PT 2+                                       10.2   17.3  )-----------( 264      ( 29 Nov 2018 06:49 )             32.3   11-29    137  |  101  |  12  ----------------------------<  120<H>  4.1   |  26  |  0.63    Ca    8.8      29 Nov 2018 06:49            MEDICATIONS  (STANDING):  aspirin enteric coated 325 milliGRAM(s) Oral two times a day  BUpivacaine liposome 1.3% Injectable (no eMAR) 20 milliLiter(s) Local Injection once  celecoxib 200 milliGRAM(s) Oral two times a day  famotidine    Tablet 20 milliGRAM(s) Oral two times a day  lactated ringers. 1000 milliLiter(s) (100 mL/Hr) IV Continuous <Continuous>  PARoxetine 20 milliGRAM(s) Oral daily    MEDICATIONS  (PRN):  acetaminophen   Tablet .. 650 milliGRAM(s) Oral every 6 hours PRN Mild Pain (1 - 3)  ketorolac   Injectable 15 milliGRAM(s) IV Push every 6 hours PRN breakthrough pain  ondansetron Injectable 4 milliGRAM(s) IV Push every 6 hours PRN Nausea and/or Vomiting  traMADol 25 milliGRAM(s) Oral every 4 hours PRN Moderate Pain (4 - 6)  traMADol 50 milliGRAM(s) Oral every 4 hours PRN Severe Pain (7 - 10)      A/P:  68y Female s/p R TKA on 11/28  -Stable  -Pain Control  -PT/WBAT  -DVT ppx: ASA  -Advance diet as tolerated  -f/u AM labs  -Dispo: home with PT

## 2018-12-02 ENCOUNTER — MOBILE ON CALL (OUTPATIENT)
Age: 68
End: 2018-12-02

## 2018-12-03 LAB — SURGICAL PATHOLOGY STUDY: SIGNIFICANT CHANGE UP

## 2018-12-04 ENCOUNTER — MOBILE ON CALL (OUTPATIENT)
Age: 68
End: 2018-12-04

## 2018-12-04 PROCEDURE — C1776: CPT

## 2018-12-04 PROCEDURE — 97161 PT EVAL LOW COMPLEX 20 MIN: CPT

## 2018-12-04 PROCEDURE — 73560 X-RAY EXAM OF KNEE 1 OR 2: CPT

## 2018-12-04 PROCEDURE — 36415 COLL VENOUS BLD VENIPUNCTURE: CPT

## 2018-12-04 PROCEDURE — 80048 BASIC METABOLIC PNL TOTAL CA: CPT

## 2018-12-04 PROCEDURE — C1713: CPT

## 2018-12-04 PROCEDURE — 88300 SURGICAL PATH GROSS: CPT

## 2018-12-04 PROCEDURE — 85025 COMPLETE CBC W/AUTO DIFF WBC: CPT

## 2018-12-07 ENCOUNTER — MOBILE ON CALL (OUTPATIENT)
Age: 68
End: 2018-12-07

## 2018-12-07 DIAGNOSIS — Z96.651 PRESENCE OF RIGHT ARTIFICIAL KNEE JOINT: ICD-10-CM

## 2018-12-07 RX ORDER — ASPIRIN 325 MG/1
325 TABLET ORAL
Refills: 0 | Status: ACTIVE | COMMUNITY
Start: 2018-12-04

## 2018-12-07 RX ORDER — PANTOPRAZOLE SODIUM 40 MG/1
40 TABLET, DELAYED RELEASE ORAL DAILY
Refills: 0 | Status: ACTIVE | COMMUNITY
Start: 2018-12-04

## 2018-12-07 RX ORDER — TRAMADOL HYDROCHLORIDE 50 MG/1
50 TABLET, COATED ORAL
Refills: 0 | Status: ACTIVE | COMMUNITY
Start: 2018-12-04

## 2018-12-10 ENCOUNTER — MOBILE ON CALL (OUTPATIENT)
Age: 68
End: 2018-12-10

## 2018-12-13 ENCOUNTER — INPATIENT (INPATIENT)
Facility: HOSPITAL | Age: 68
LOS: 3 days | Discharge: HOME | DRG: 392 | End: 2018-12-17
Attending: SURGERY | Admitting: SURGERY
Payer: MEDICARE

## 2018-12-13 VITALS
RESPIRATION RATE: 19 BRPM | WEIGHT: 139.99 LBS | OXYGEN SATURATION: 98 % | TEMPERATURE: 99 F | SYSTOLIC BLOOD PRESSURE: 106 MMHG | HEART RATE: 120 BPM | HEIGHT: 64 IN | DIASTOLIC BLOOD PRESSURE: 67 MMHG

## 2018-12-13 DIAGNOSIS — Z90.710 ACQUIRED ABSENCE OF BOTH CERVIX AND UTERUS: Chronic | ICD-10-CM

## 2018-12-13 DIAGNOSIS — Z98.890 OTHER SPECIFIED POSTPROCEDURAL STATES: Chronic | ICD-10-CM

## 2018-12-13 DIAGNOSIS — Z90.49 ACQUIRED ABSENCE OF OTHER SPECIFIED PARTS OF DIGESTIVE TRACT: Chronic | ICD-10-CM

## 2018-12-13 DIAGNOSIS — Z90.89 ACQUIRED ABSENCE OF OTHER ORGANS: Chronic | ICD-10-CM

## 2018-12-13 PROBLEM — F32.9 MAJOR DEPRESSIVE DISORDER, SINGLE EPISODE, UNSPECIFIED: Chronic | Status: ACTIVE | Noted: 2018-11-28

## 2018-12-13 PROBLEM — E78.5 HYPERLIPIDEMIA, UNSPECIFIED: Chronic | Status: ACTIVE | Noted: 2018-11-27

## 2018-12-13 LAB
ALBUMIN SERPL ELPH-MCNC: 3.5 G/DL — SIGNIFICANT CHANGE UP (ref 3.3–5)
ALP SERPL-CCNC: 114 U/L — SIGNIFICANT CHANGE UP (ref 40–120)
ALT FLD-CCNC: 24 U/L — SIGNIFICANT CHANGE UP (ref 10–45)
ANION GAP SERPL CALC-SCNC: 10 MMOL/L — SIGNIFICANT CHANGE UP (ref 5–17)
APPEARANCE UR: CLEAR — SIGNIFICANT CHANGE UP
APTT BLD: 29.7 SEC — SIGNIFICANT CHANGE UP (ref 27.5–36.3)
AST SERPL-CCNC: 20 U/L — SIGNIFICANT CHANGE UP (ref 10–40)
BASOPHILS NFR BLD AUTO: 0.2 % — SIGNIFICANT CHANGE UP (ref 0–2)
BILIRUB SERPL-MCNC: 0.3 MG/DL — SIGNIFICANT CHANGE UP (ref 0.2–1.2)
BILIRUB UR-MCNC: NEGATIVE — SIGNIFICANT CHANGE UP
BUN SERPL-MCNC: 12 MG/DL — SIGNIFICANT CHANGE UP (ref 7–23)
CALCIUM SERPL-MCNC: 9.4 MG/DL — SIGNIFICANT CHANGE UP (ref 8.4–10.5)
CHLORIDE SERPL-SCNC: 100 MMOL/L — SIGNIFICANT CHANGE UP (ref 96–108)
CO2 SERPL-SCNC: 28 MMOL/L — SIGNIFICANT CHANGE UP (ref 22–31)
COLOR SPEC: YELLOW — SIGNIFICANT CHANGE UP
CREAT SERPL-MCNC: 0.71 MG/DL — SIGNIFICANT CHANGE UP (ref 0.5–1.3)
DIFF PNL FLD: NEGATIVE — SIGNIFICANT CHANGE UP
EOSINOPHIL NFR BLD AUTO: 1.6 % — SIGNIFICANT CHANGE UP (ref 0–6)
GLUCOSE SERPL-MCNC: 110 MG/DL — HIGH (ref 70–99)
GLUCOSE UR QL: NEGATIVE — SIGNIFICANT CHANGE UP
HCT VFR BLD CALC: 29.8 % — LOW (ref 34.5–45)
HGB BLD-MCNC: 9.6 G/DL — LOW (ref 11.5–15.5)
INR BLD: 1.12 — SIGNIFICANT CHANGE UP (ref 0.88–1.16)
KETONES UR-MCNC: NEGATIVE — SIGNIFICANT CHANGE UP
LACTATE SERPL-SCNC: 0.7 MMOL/L — SIGNIFICANT CHANGE UP (ref 0.5–2)
LEUKOCYTE ESTERASE UR-ACNC: NEGATIVE — SIGNIFICANT CHANGE UP
LYMPHOCYTES # BLD AUTO: 5.5 % — LOW (ref 13–44)
MCHC RBC-ENTMCNC: 29.4 PG — SIGNIFICANT CHANGE UP (ref 27–34)
MCHC RBC-ENTMCNC: 32.2 G/DL — SIGNIFICANT CHANGE UP (ref 32–36)
MCV RBC AUTO: 91.4 FL — SIGNIFICANT CHANGE UP (ref 80–100)
MONOCYTES NFR BLD AUTO: 5.3 % — SIGNIFICANT CHANGE UP (ref 2–14)
NEUTROPHILS NFR BLD AUTO: 87.4 % — HIGH (ref 43–77)
NITRITE UR-MCNC: NEGATIVE — SIGNIFICANT CHANGE UP
PH UR: 6 — SIGNIFICANT CHANGE UP (ref 5–8)
PLATELET # BLD AUTO: 476 K/UL — HIGH (ref 150–400)
POTASSIUM SERPL-MCNC: 4 MMOL/L — SIGNIFICANT CHANGE UP (ref 3.5–5.3)
POTASSIUM SERPL-SCNC: 4 MMOL/L — SIGNIFICANT CHANGE UP (ref 3.5–5.3)
PROT SERPL-MCNC: 6.6 G/DL — SIGNIFICANT CHANGE UP (ref 6–8.3)
PROT UR-MCNC: NEGATIVE MG/DL — SIGNIFICANT CHANGE UP
PROTHROM AB SERPL-ACNC: 12.7 SEC — SIGNIFICANT CHANGE UP (ref 10–12.9)
RAPID RVP RESULT: SIGNIFICANT CHANGE UP
RBC # BLD: 3.26 M/UL — LOW (ref 3.8–5.2)
RBC # FLD: 12.8 % — SIGNIFICANT CHANGE UP (ref 10.3–16.9)
SODIUM SERPL-SCNC: 138 MMOL/L — SIGNIFICANT CHANGE UP (ref 135–145)
SP GR SPEC: 1.01 — SIGNIFICANT CHANGE UP (ref 1–1.03)
UROBILINOGEN FLD QL: 0.2 E.U./DL — SIGNIFICANT CHANGE UP
WBC # BLD: 18 K/UL — HIGH (ref 3.8–10.5)
WBC # FLD AUTO: 18 K/UL — HIGH (ref 3.8–10.5)

## 2018-12-13 PROCEDURE — 72132 CT LUMBAR SPINE W/DYE: CPT | Mod: 26

## 2018-12-13 PROCEDURE — 72129 CT CHEST SPINE W/DYE: CPT | Mod: 26

## 2018-12-13 PROCEDURE — 99285 EMERGENCY DEPT VISIT HI MDM: CPT

## 2018-12-13 PROCEDURE — 74177 CT ABD & PELVIS W/CONTRAST: CPT | Mod: 26

## 2018-12-13 RX ORDER — PIPERACILLIN AND TAZOBACTAM 4; .5 G/20ML; G/20ML
3.38 INJECTION, POWDER, LYOPHILIZED, FOR SOLUTION INTRAVENOUS ONCE
Qty: 0 | Refills: 0 | Status: COMPLETED | OUTPATIENT
Start: 2018-12-13 | End: 2018-12-13

## 2018-12-13 RX ORDER — PIPERACILLIN AND TAZOBACTAM 4; .5 G/20ML; G/20ML
3.38 INJECTION, POWDER, LYOPHILIZED, FOR SOLUTION INTRAVENOUS EVERY 6 HOURS
Qty: 0 | Refills: 0 | Status: DISCONTINUED | OUTPATIENT
Start: 2018-12-13 | End: 2018-12-17

## 2018-12-13 RX ORDER — SODIUM CHLORIDE 9 MG/ML
1000 INJECTION, SOLUTION INTRAVENOUS
Qty: 0 | Refills: 0 | Status: DISCONTINUED | OUTPATIENT
Start: 2018-12-13 | End: 2018-12-16

## 2018-12-13 RX ORDER — ACETAMINOPHEN 500 MG
650 TABLET ORAL ONCE
Qty: 0 | Refills: 0 | Status: COMPLETED | OUTPATIENT
Start: 2018-12-13 | End: 2018-12-13

## 2018-12-13 RX ORDER — IOHEXOL 300 MG/ML
30 INJECTION, SOLUTION INTRAVENOUS ONCE
Qty: 0 | Refills: 0 | Status: COMPLETED | OUTPATIENT
Start: 2018-12-13 | End: 2018-12-13

## 2018-12-13 RX ORDER — HEPARIN SODIUM 5000 [USP'U]/ML
5000 INJECTION INTRAVENOUS; SUBCUTANEOUS EVERY 8 HOURS
Qty: 0 | Refills: 0 | Status: DISCONTINUED | OUTPATIENT
Start: 2018-12-13 | End: 2018-12-17

## 2018-12-13 RX ORDER — ACETAMINOPHEN 500 MG
1000 TABLET ORAL ONCE
Qty: 0 | Refills: 0 | Status: COMPLETED | OUTPATIENT
Start: 2018-12-13 | End: 2018-12-14

## 2018-12-13 RX ADMIN — PIPERACILLIN AND TAZOBACTAM 200 GRAM(S): 4; .5 INJECTION, POWDER, LYOPHILIZED, FOR SOLUTION INTRAVENOUS at 17:45

## 2018-12-13 RX ADMIN — HEPARIN SODIUM 5000 UNIT(S): 5000 INJECTION INTRAVENOUS; SUBCUTANEOUS at 23:51

## 2018-12-13 RX ADMIN — Medication 650 MILLIGRAM(S): at 15:13

## 2018-12-13 RX ADMIN — IOHEXOL 30 MILLILITER(S): 300 INJECTION, SOLUTION INTRAVENOUS at 15:09

## 2018-12-13 NOTE — ED PROVIDER NOTE - PROGRESS NOTE DETAILS
no progression of symptoms. mri delayed 2/2 machine being down but they are aware of pt MRI is down. will get CT w/ iv contrast

## 2018-12-13 NOTE — ED ADULT TRIAGE NOTE - OTHER COMPLAINTS
Pt has been having trouble urinating for about 3 days but yesterday she stopped peeing around 10pm. Pt had right knee surgery 2 weeks ago and states she has been having fever.

## 2018-12-13 NOTE — H&P ADULT - HISTORY OF PRESENT ILLNESS
This is a 67 yo F with HLD and anxiety, PSHx of open appendectomy, open hysterectomy, total knee replacement on 11/28 presented with urinary retention for 1 day. Patient has been having vague, intermittent abdominal pain for the past 4 days, located at the pelvic region/lower abdomen and associated with fever 101 at home. However patient though it was just viral enteritis as her daughter experienced similar issues. Pain was not severe but she was unable to urinate last night and prompt her to come to the ER for further management. She noticed that she has been having similar pain 2-3 times this year, it was not severe enough for her to come to the ER, she usually just had CLD for few days and took PO antibiotics from her PCP. Last colonoscopy was 10 years ago (she was due this year but unable to get it done). Was told to have multiple diverticulosis, no polyps.   Otherwise, last BM was yesterday, no blood. Not passing gas, tolerating her diet. This is a 69 yo F with HLD and anxiety, PSHx of open appendectomy, open hysterectomy,right total knee replacement on 11/28 presented with urinary retention for 1 day. Patient has been having vague, intermittent abdominal pain for the past 4 days, located at the pelvic region/lower abdomen and associated with fever 101 at home. However patient though it was just viral enteritis as her daughter experienced similar issues. Pain was not severe but she was unable to urinate last night and prompt her to come to the ER for further management. She noticed that she has been having similar pain 2-3 times this year, it was not severe enough for her to come to the ER, she usually just had CLD for few days and took PO antibiotics from her PCP. Last colonoscopy was 10 years ago (she was due this year but unable to get it done). Was told to have multiple diverticulosis, no polyps.   Otherwise, last BM was yesterday, no blood. Not passing gas, tolerating her diet.

## 2018-12-13 NOTE — H&P ADULT - ATTENDING COMMENTS
Patient seen and examined.  Agree with above plan.  Reviewed natural history of diverticulitis with patient.

## 2018-12-13 NOTE — ED PROVIDER NOTE - MEDICAL DECISION MAKING DETAILS
worked pt up extensively, found to have sigmoid diverticulitis w/ 8cm pelvic abscess. iv abx started and general surgery called. orthopedics updated.

## 2018-12-13 NOTE — H&P ADULT - NSHPPHYSICALEXAM_GEN_ALL_CORE
STATUS POST:       SUBJECTIVE: Patient seen and examined bedside by chief resident.        Vital Signs Last 24 Hrs  T(C): 37.2 (13 Dec 2018 17:33), Max: 38.8 (13 Dec 2018 14:02)  T(F): 98.9 (13 Dec 2018 17:33), Max: 101.9 (13 Dec 2018 14:02)  HR: 87 (13 Dec 2018 17:33) (87 - 120)  BP: 108/62 (13 Dec 2018 17:33) (101/48 - 108/62)  BP(mean): --  RR: 18 (13 Dec 2018 17:33) (18 - 19)  SpO2: 99% (13 Dec 2018 17:33) (98% - 100%)  I&O's Detail      General: NAD, resting comfortably in bed  C/V: NSR  Pulm: Nonlabored breathing, no respiratory distress  Abd: soft, NT/ND.  Extrem: WWP, no edema, SCDs in place

## 2018-12-13 NOTE — CONSULT NOTE ADULT - CONSULT REASON
Urinary retention x1 day and fever x5 days s/p TKA POD#10-15 Urinary retention x1 day and fever x1.5 weeks s/p TKA POD#10-15

## 2018-12-13 NOTE — ED ADULT NURSE NOTE - OBJECTIVE STATEMENT
Pt presents to ED with c/o urinary retention, states she has been having issues x3 days, worsening last night approx 10pm. Reports R knee replacement approx 2 weeks ago, per pt "they put something in my back that numbed everything." Pt afebrile in ED.

## 2018-12-13 NOTE — ED ADULT NURSE NOTE - NSIMPLEMENTINTERV_GEN_ALL_ED
Implemented All Fall Risk Interventions:  [place] to call system. Call bell, personal items and telephone within reach. Instruct patient to call for assistance. Room bathroom lighting operational. Non-slip footwear when patient is off stretcher. Physically safe environment: no spills, clutter or unnecessary equipment. Stretcher in lowest position, wheels locked, appropriate side rails in place. Provide visual cue, wrist band, yellow gown, etc. Monitor gait and stability. Monitor for mental status changes and reorient to person, place, and time. Review medications for side effects contributing to fall risk. Reinforce activity limits and safety measures with patient and family.

## 2018-12-13 NOTE — H&P ADULT - ASSESSMENT
69 yo F with acute sigmoid diverticulitis with pelvic abscess and urinary retention, febrile to 101 and +leukocytosis of 18, for IR drainage of her abscess collection.     Plan :  Admit to . team 4, telemetry floor  NPO/IVF  IR drainage of the pelvic abscess  Serial documented abdominal exam  I/O (patient ahs a dudley)  Pain control, no narcotics  IV.Zosyn  DVT prophylaxis  OOB/IS    Plan d/w  and chief 67 yo F with acute sigmoid diverticulitis with pelvic abscess and urinary retention, febrile to 101 and +leukocytosis of 18, for IR drainage of her abscess collection.     Plan :  Admit to . team 4, telemetry floor  NPO/IVF  IR drainage of the pelvic abscess  Serial documented abdominal exam Q6  I/O (patient ahs a dudley)  Pain control, no narcotics  IV.Zosyn  DVT prophylaxis  OOB/IS    Plan d/w  and chief

## 2018-12-13 NOTE — ED PROVIDER NOTE - OBJECTIVE STATEMENT
The patient is a 68 year old female with no pertinent PMHx, s/p right TKA POD#15 with Dr. Vincent Scales presenting to the ED today with complaints of urinary retention x 1 day and fever x 1.5 weeks.  The patient states that she has had elevated temperature for the 1.5 weeks (has been consistently around 99.7F, with occasional spikes to 101F). She also notes a few episodes of non-bloody diarrhea x 4 nights ago and has been experiencing diffuse abdominal pain over the last week as well. The patient states that the diarrhea has since resolved but that her temperature has been persistently elevated despite alternating Motrin and Tylenol daily. The patient also reports that she has had urinary retention since last night prompting her ED visit this morning. The patient states that since her knee surgery her right knee pain has been well controlled. The patient states she has some residual numbness around the incision site but denies any drainage from the incision site, knee erythema, increased warmth, or decreased knee ROM. She states that she has been able to ambulate without assistance. Denies chest pain, SOB, nausea, emesis, numbness/tingling/weakness of b/l lower extremities.

## 2018-12-13 NOTE — CONSULT NOTE ADULT - SUBJECTIVE AND OBJECTIVE BOX
CC: Urinary retention x1 day and fever x5 days s/p TKA (POD#10-15)    HPI: 68 year old female, no pertinent PMHx, s/p right TKA POD#15 (Dr. Vincent Scales) consulted for complaint of urinary retention x1 day and elevated temperature x5 days. Since the knee surgery pt reports right knee pain has been well controlled without associated erythema, increased warmth, or decreasing ROM. Able to ambulate without assistance. Pt states 4 nights ago she had nonbloody diarrhea for 24 hours with associated fever of 101 F PO. Since then pt states diarrhea has resolved, but temperature has been persistently elevated to 99.7 F despite taking Motrin and Tylenol daily. She also complains of mild b/l lumbar pain and suprapubic abdominal pain x3 days. Pt states since last night she has been with urinary retention despite straining and adequate PO intake, prompting ED visit.     Denies associated chest pain, SOB, nausea, emesis, numbness/tingling/weakness of b/l lower extremities.     ROS: reviewed as documented in HPI    Allergies (PER EMR): sulfa drugs: Drug Category, Unknown  Medications: Denies    PMHx (per EMR): Depression, HLD (hyperlipidemia).    Past Surgical History (per EMR):  H/O knee surgery  right  H/O shoulder surgery  right rotator  H/O: hysterectomy    History of appendectomy    History of tonsillectomy.    Social History (per EMR, confirmed by pt): Quit tobacco use x45 years ago. Social EtOH. No recreational drug use.      Physical Exam:  Vital Signs Last 24 Hrs  T(C): 38.8 (13 Dec 2018 14:02), Max: 38.8 (13 Dec 2018 14:02)  T(F): 101.9 (13 Dec 2018 14:02), Max: 101.9 (13 Dec 2018 14:02)  HR: 105 (13 Dec 2018 14:02) (92 - 120)  BP: 101/48 (13 Dec 2018 14:02) (101/48 - 106/67)  RR: 18 (13 Dec 2018 14:02) (18 - 19)  SpO2: 100% (13 Dec 2018 14:02) (98% - 100%)    General: A&O x3, NAD  Skin: Tactile warmth to forehead.   Abdominal: Soft, mild suprapubic TTP. No CVAT b/l  MSK: Right knee with well healed vertical surgical scar, steri strips in place, C/D/I without drainage or surrounding erythema. No increased warmth to the right knee compared to left. Full, nonpainful ROM of right knee. .  Back: mild paralumbar TTP. Full ROM.   Motor: 5/5 strength EHL/FHL/TA/GS b/l  Sensation: Grossly intact to light touch to b/l lower extremities distally  Pulses: DP pulses palpable b/l                           9.6    18.0  )-----------( 476      ( 13 Dec 2018 10:15 ) - Note: WBC from 18 = 17.3             29.8     12-    138  |  100  |  12  ----------------------------<  110<H>  4.0   |  28  |  0.71    Ca    9.4      13 Dec 2018 10:15    TPro  6.6  /  Alb  3.5  /  TBili  0.3  /  DBili  x   /  AST  20  /  ALT  24  /  AlkPhos  114  12-13    Urinalysis Basic - ( 13 Dec 2018 09:12 )    Color: Yellow / Appearance: Clear / S.010 / pH: x  Gluc: x / Ketone: NEGATIVE  / Bili: Negative / Urobili: 0.2 E.U./dL   Blood: x / Protein: NEGATIVE mg/dL / Nitrite: NEGATIVE   Leuk Esterase: NEGATIVE / RBC: x / WBC x   Sq Epi: x / Non Sq Epi: x / Bacteria: x    CT Thoracic/Lumbar spine with IV contrast:   IMPRESSION:  1. No prevertebral mass or epidural collection or abnormal enhancement.    2. Findings suggestive of sigmoid diverticulitis. Recommend clinical   correlation and dedicated abdominopelvic CT if necessary.    A/P: 67 y/o F s/p right TKA POD#15 with Dr. Scales consulted for complaint of elevated temperature, urinary retention, abdominal pain in the setting of recent knee surgery.    - Low suspicion for septic knee as etiology of symptoms based on clinical exam.  - Continue with knee post-op care instructions given at discharge  - Will discuss with Dr. Vincent Scales    *** I was present for the above history and physical conducted by ADELE and I agree with the above**** ALFONSO Hartley PA-C CC: Urinary retention x1 day and fever x1.5 weeks s/p TKA (POD#10-15)    HPI: The patient is a 68 year old female with no pertinent PMHx, s/p right TKA POD#15 with Dr. Vincent Scales presenting to the ED today with complaints of urinary retention x 1 day and fever x 1.5 weeks.  The patient states that she has had elevated temperature for the 1.5 weeks (has been consistently around 99.7F, with occasional spikes to 101F). She also notes a few episodes of non-bloody diarrhea x 4 nights ago and has been experiencing diffuse abdominal pain over the last week as well. The patient states that the diarrhea has since resolved but that her temperature has been persistently elevated despite alternating Motrin and Tylenol daily. The patient also reports that she has had urinary retention since last night prompting her ED visit this morning. The patient states that since her knee surgery her right knee pain has been well controlled. The patient states she has some residual numbness around the incision site but denies any drainage from the incision site, knee erythema, increased warmth, or decreased knee ROM. She states that she has been able to ambulate without assistance. Denies chest pain, SOB, nausea, emesis, numbness/tingling/weakness of b/l lower extremities.     ROS: as per HPI     Allergies:  Sulfa    PMHx:  Hyperlipidemia  Per EMR, Depression    Medications:  Denies    Past Surgical History (per EMR):  H/O knee surgery  right  H/O shoulder surgery  right rotator  H/O: hysterectomy    History of appendectomy    History of tonsillectomy    Social History (per EMR, confirmed by pt):  Quit tobacco use x45 years ago. Social EtOH. No recreational drug use.    Physical Exam:  Vital Signs Last 24 Hrs  T(C): 38.8 (13 Dec 2018 14:02), Max: 38.8 (13 Dec 2018 14:02)  T(F): 101.9 (13 Dec 2018 14:02), Max: 101.9 (13 Dec 2018 14:02)  HR: 105 (13 Dec 2018 14:02) (92 - 120)  BP: 101/48 (13 Dec 2018 14:02) (101/48 - 106/67)  RR: 18 (13 Dec 2018 14:02) (18 - 19)  SpO2: 100% (13 Dec 2018 14:02) (98% - 100%)    General: A&O x3, NAD  Skin: Tactile warmth, well-perfused  Abdominal: Soft, mild suprapubic TTP. No CVAT b/l  MSK: Right knee with well healed vertical surgical scar, steri strips in place, C/D/I without drainage or surrounding erythema. No increased warmth to the right knee compared to left. Full, nonpainful ROM of right knee. Motor: 5/5 strength EHL/FHL/TA/GS b/l lower extremities. Sensation: Grossly intact to light touch to b/l lower extremities distally. Pulses: DP pulses palpable b/l.                          9.6    18.0  )-----------( 476      ( 13 Dec 2018 10:15 ) - Note: WBC from 18 = 17.3             29.8     12-    138  |  100  |  12  ----------------------------<  110<H>  4.0   |  28  |  0.71    Ca    9.4      13 Dec 2018 10:15    TPro  6.6  /  Alb  3.5  /  TBili  0.3  /  DBili  x   /  AST  20  /  ALT  24  /  AlkPhos  114  12-13    Urinalysis Basic - ( 13 Dec 2018 09:12 )    Color: Yellow / Appearance: Clear / S.010 / pH: x  Gluc: x / Ketone: NEGATIVE  / Bili: Negative / Urobili: 0.2 E.U./dL   Blood: x / Protein: NEGATIVE mg/dL / Nitrite: NEGATIVE   Leuk Esterase: NEGATIVE / RBC: x / WBC x   Sq Epi: x / Non Sq Epi: x / Bacteria: x    Imaging:  CT Thoracic/Lumbar spine with IV contrast:   IMPRESSION:  1. No prevertebral mass or epidural collection or abnormal enhancement.    2. Findings suggestive of sigmoid diverticulitis. Recommend clinical   correlation and dedicated abdominopelvic CT if necessary.    CT abdomen/pelvis with oral and IV contrast:  Sigmoid diverticulitis with 7-8 cm multiloculated deep pelvic abscess as   described.    A/P: 69 y/o F s/p right TKA POD#15 with Dr. Scales consulted for complaint of elevated temperature, urinary retention, abdominal pain in the setting of recent knee surgery.  - Knee does not seem to be source of fever; patient is following typical post-operative total knee replacement course  - If admitted, will follow   - Will discuss with Dr. Vincent Scales    *** I was present for the above history and physical conducted by ADELE and I agree with the above**** N SARTHAK Hartley

## 2018-12-14 LAB
ANION GAP SERPL CALC-SCNC: 12 MMOL/L — SIGNIFICANT CHANGE UP (ref 5–17)
BUN SERPL-MCNC: 12 MG/DL — SIGNIFICANT CHANGE UP (ref 7–23)
CALCIUM SERPL-MCNC: 9.2 MG/DL — SIGNIFICANT CHANGE UP (ref 8.4–10.5)
CHLORIDE SERPL-SCNC: 100 MMOL/L — SIGNIFICANT CHANGE UP (ref 96–108)
CO2 SERPL-SCNC: 27 MMOL/L — SIGNIFICANT CHANGE UP (ref 22–31)
CREAT SERPL-MCNC: 0.82 MG/DL — SIGNIFICANT CHANGE UP (ref 0.5–1.3)
GLUCOSE SERPL-MCNC: 111 MG/DL — HIGH (ref 70–99)
GRAM STN FLD: SIGNIFICANT CHANGE UP
HCT VFR BLD CALC: 28.6 % — LOW (ref 34.5–45)
HGB BLD-MCNC: 9 G/DL — LOW (ref 11.5–15.5)
MAGNESIUM SERPL-MCNC: 1.8 MG/DL — SIGNIFICANT CHANGE UP (ref 1.6–2.6)
MCHC RBC-ENTMCNC: 29.5 PG — SIGNIFICANT CHANGE UP (ref 27–34)
MCHC RBC-ENTMCNC: 31.5 G/DL — LOW (ref 32–36)
MCV RBC AUTO: 93.8 FL — SIGNIFICANT CHANGE UP (ref 80–100)
PHOSPHATE SERPL-MCNC: 4.8 MG/DL — HIGH (ref 2.5–4.5)
PLATELET # BLD AUTO: 527 K/UL — HIGH (ref 150–400)
POTASSIUM SERPL-MCNC: 3.7 MMOL/L — SIGNIFICANT CHANGE UP (ref 3.5–5.3)
POTASSIUM SERPL-SCNC: 3.7 MMOL/L — SIGNIFICANT CHANGE UP (ref 3.5–5.3)
RBC # BLD: 3.05 M/UL — LOW (ref 3.8–5.2)
RBC # FLD: 12.7 % — SIGNIFICANT CHANGE UP (ref 10.3–16.9)
SODIUM SERPL-SCNC: 139 MMOL/L — SIGNIFICANT CHANGE UP (ref 135–145)
SPECIMEN SOURCE: SIGNIFICANT CHANGE UP
WBC # BLD: 18.2 K/UL — HIGH (ref 3.8–10.5)
WBC # FLD AUTO: 18.2 K/UL — HIGH (ref 3.8–10.5)

## 2018-12-14 PROCEDURE — 77012 CT SCAN FOR NEEDLE BIOPSY: CPT | Mod: 26

## 2018-12-14 PROCEDURE — 10160 PNXR ASPIR ABSC HMTMA BULLA: CPT

## 2018-12-14 RX ORDER — ACETAMINOPHEN 500 MG
1000 TABLET ORAL ONCE
Qty: 0 | Refills: 0 | Status: COMPLETED | OUTPATIENT
Start: 2018-12-14 | End: 2018-12-14

## 2018-12-14 RX ORDER — HYDROMORPHONE HYDROCHLORIDE 2 MG/ML
0.5 INJECTION INTRAMUSCULAR; INTRAVENOUS; SUBCUTANEOUS EVERY 4 HOURS
Qty: 0 | Refills: 0 | Status: DISCONTINUED | OUTPATIENT
Start: 2018-12-14 | End: 2018-12-16

## 2018-12-14 RX ORDER — POTASSIUM CHLORIDE 20 MEQ
10 PACKET (EA) ORAL
Qty: 0 | Refills: 0 | Status: COMPLETED | OUTPATIENT
Start: 2018-12-14 | End: 2018-12-14

## 2018-12-14 RX ORDER — MAGNESIUM SULFATE 500 MG/ML
1 VIAL (ML) INJECTION ONCE
Qty: 0 | Refills: 0 | Status: COMPLETED | OUTPATIENT
Start: 2018-12-14 | End: 2018-12-14

## 2018-12-14 RX ADMIN — PIPERACILLIN AND TAZOBACTAM 200 GRAM(S): 4; .5 INJECTION, POWDER, LYOPHILIZED, FOR SOLUTION INTRAVENOUS at 06:08

## 2018-12-14 RX ADMIN — Medication 100 GRAM(S): at 12:43

## 2018-12-14 RX ADMIN — PIPERACILLIN AND TAZOBACTAM 200 GRAM(S): 4; .5 INJECTION, POWDER, LYOPHILIZED, FOR SOLUTION INTRAVENOUS at 14:14

## 2018-12-14 RX ADMIN — HYDROMORPHONE HYDROCHLORIDE 0.5 MILLIGRAM(S): 2 INJECTION INTRAMUSCULAR; INTRAVENOUS; SUBCUTANEOUS at 17:27

## 2018-12-14 RX ADMIN — SODIUM CHLORIDE 120 MILLILITER(S): 9 INJECTION, SOLUTION INTRAVENOUS at 12:43

## 2018-12-14 RX ADMIN — Medication 1000 MILLIGRAM(S): at 01:43

## 2018-12-14 RX ADMIN — Medication 100 MILLIEQUIVALENT(S): at 14:15

## 2018-12-14 RX ADMIN — HEPARIN SODIUM 5000 UNIT(S): 5000 INJECTION INTRAVENOUS; SUBCUTANEOUS at 14:14

## 2018-12-14 RX ADMIN — Medication 400 MILLIGRAM(S): at 12:43

## 2018-12-14 RX ADMIN — HYDROMORPHONE HYDROCHLORIDE 0.5 MILLIGRAM(S): 2 INJECTION INTRAMUSCULAR; INTRAVENOUS; SUBCUTANEOUS at 18:30

## 2018-12-14 RX ADMIN — PIPERACILLIN AND TAZOBACTAM 200 GRAM(S): 4; .5 INJECTION, POWDER, LYOPHILIZED, FOR SOLUTION INTRAVENOUS at 00:49

## 2018-12-14 RX ADMIN — HEPARIN SODIUM 5000 UNIT(S): 5000 INJECTION INTRAVENOUS; SUBCUTANEOUS at 06:08

## 2018-12-14 RX ADMIN — HEPARIN SODIUM 5000 UNIT(S): 5000 INJECTION INTRAVENOUS; SUBCUTANEOUS at 22:14

## 2018-12-14 RX ADMIN — Medication 1000 MILLIGRAM(S): at 13:30

## 2018-12-14 RX ADMIN — PIPERACILLIN AND TAZOBACTAM 200 GRAM(S): 4; .5 INJECTION, POWDER, LYOPHILIZED, FOR SOLUTION INTRAVENOUS at 20:02

## 2018-12-14 RX ADMIN — Medication 400 MILLIGRAM(S): at 00:43

## 2018-12-14 NOTE — PROGRESS NOTE ADULT - ASSESSMENT
67 yo F with acute sigmoid diverticulitis with pelvic abscess and urinary retention, febrile to 101 and +leukocytosis of 18, for IR drainage of her abscess collection.     sips/chips/IVF  Serial documented abdominal exam  I/O (patient ahs a dudley)  Pain control, no narcotics  IV.Zosyn  DVT prophylaxis  OOB/IS

## 2018-12-14 NOTE — PROGRESS NOTE ADULT - SUBJECTIVE AND OBJECTIVE BOX
Clinically unchanged. Mild tenderness suprapubically  AFVSS    A/P: Diverticulitis with pelvic abscess.   1. NPO, IVF  2. IV Zosyn  3. IR drainage    Dr. Ledesma covering until 12/17

## 2018-12-14 NOTE — PROGRESS NOTE ADULT - SUBJECTIVE AND OBJECTIVE BOX
SUBJECTIVE: Patient seen and examined bedside. Patient reports LLQ abdominal pain. Patient denies nausea and vomiting. S/P IR drainage of LLQ abscess    heparin  Injectable 5000 Unit(s) SubCutaneous every 8 hours  piperacillin/tazobactam IVPB. 3.375 Gram(s) IV Intermittent every 6 hours      Vital Signs Last 24 Hrs  T(C): 39.3 (14 Dec 2018 13:58), Max: 39.3 (14 Dec 2018 13:58)  T(F): 102.7 (14 Dec 2018 13:58), Max: 102.7 (14 Dec 2018 13:58)  HR: 112 (14 Dec 2018 13:07) (92 - 112)  BP: 129/87 (14 Dec 2018 13:07) (96/54 - 129/87)  BP(mean): 103 (14 Dec 2018 13:07) (69 - 103)  RR: 16 (14 Dec 2018 13:07) (16 - 19)  SpO2: 97% (14 Dec 2018 13:07) (96% - 100%)  I&O's Detail    13 Dec 2018 07:01  -  14 Dec 2018 07:00  --------------------------------------------------------  IN:    lactated ringers.: 1080 mL  Total IN: 1080 mL    OUT:    Voided: 400 mL  Total OUT: 400 mL    Total NET: 680 mL      14 Dec 2018 07:01  -  14 Dec 2018 17:33  --------------------------------------------------------  IN:    lactated ringers.: 600 mL  Total IN: 600 mL    OUT:    Bulb: 35 mL    Indwelling Catheter - Urethral: 955 mL  Total OUT: 990 mL    Total NET: -390 mL          General: NAD, resting comfortably in bed  C/V: NSR  Pulm: Nonlabored breathing, no respiratory distress  Abd: soft, minimally distended. tenderness in LLQ, SAGE with succous drainage  Extrem: WWP, no edema, SCDs in place        LABS:                        9.0    18.2  )-----------( 527      ( 14 Dec 2018 08:05 )             28.6     12-14    139  |  100  |  12  ----------------------------<  111<H>  3.7   |  27  |  0.82    Ca    9.2      14 Dec 2018 08:05  Phos  4.8     12-14  Mg     1.8     12-14    TPro  6.6  /  Alb  3.5  /  TBili  0.3  /  DBili  x   /  AST  20  /  ALT  24  /  AlkPhos  114  12-13    PT/INR - ( 13 Dec 2018 10:15 )   PT: 12.7 sec;   INR: 1.12          PTT - ( 13 Dec 2018 10:15 )  PTT:29.7 sec  Urinalysis Basic - ( 13 Dec 2018 09:12 )    Color: Yellow / Appearance: Clear / S.010 / pH: x  Gluc: x / Ketone: NEGATIVE  / Bili: Negative / Urobili: 0.2 E.U./dL   Blood: x / Protein: NEGATIVE mg/dL / Nitrite: NEGATIVE   Leuk Esterase: NEGATIVE / RBC: x / WBC x   Sq Epi: x / Non Sq Epi: x / Bacteria: x        RADIOLOGY & ADDITIONAL STUDIES:

## 2018-12-14 NOTE — PROGRESS NOTE ADULT - SUBJECTIVE AND OBJECTIVE BOX
Serial Exam    S:    Pain improved from ED.   Denies nausea/vomiting    O:  Vital Signs Last 24 Hrs  T(C): 37.7 (13 Dec 2018 23:18), Max: 38.8 (13 Dec 2018 14:02)  T(F): 99.9 (13 Dec 2018 23:18), Max: 101.9 (13 Dec 2018 14:02)  HR: 92 (14 Dec 2018 00:49) (87 - 120)  BP: 104/48 (14 Dec 2018 00:49) (96/54 - 114/70)  BP(mean): 69 (14 Dec 2018 00:49) (69 - 69)  RR: 19 (14 Dec 2018 00:49) (18 - 19)  SpO2: 97% (14 Dec 2018 00:49) (97% - 100%)    General: NAD, Comfortable in bed     Neuro: A&Ox3  Respiratory: nonlabored breathing, no respiratory distress    Abd: soft,  nondistended,   TTP in LLQ without guarding or rebound.  Extremities: WWP, nonedematous, SCDs in place

## 2018-12-15 LAB
ANION GAP SERPL CALC-SCNC: 14 MMOL/L — SIGNIFICANT CHANGE UP (ref 5–17)
BUN SERPL-MCNC: 8 MG/DL — SIGNIFICANT CHANGE UP (ref 7–23)
CALCIUM SERPL-MCNC: 8.6 MG/DL — SIGNIFICANT CHANGE UP (ref 8.4–10.5)
CHLORIDE SERPL-SCNC: 100 MMOL/L — SIGNIFICANT CHANGE UP (ref 96–108)
CO2 SERPL-SCNC: 25 MMOL/L — SIGNIFICANT CHANGE UP (ref 22–31)
CREAT SERPL-MCNC: 0.68 MG/DL — SIGNIFICANT CHANGE UP (ref 0.5–1.3)
CULTURE RESULTS: NO GROWTH — SIGNIFICANT CHANGE UP
GLUCOSE BLDC GLUCOMTR-MCNC: 92 MG/DL — SIGNIFICANT CHANGE UP (ref 70–99)
GLUCOSE SERPL-MCNC: 91 MG/DL — SIGNIFICANT CHANGE UP (ref 70–99)
HCT VFR BLD CALC: 27.5 % — LOW (ref 34.5–45)
HGB BLD-MCNC: 8.5 G/DL — LOW (ref 11.5–15.5)
MAGNESIUM SERPL-MCNC: 2.2 MG/DL — SIGNIFICANT CHANGE UP (ref 1.6–2.6)
MCHC RBC-ENTMCNC: 29.2 PG — SIGNIFICANT CHANGE UP (ref 27–34)
MCHC RBC-ENTMCNC: 30.9 G/DL — LOW (ref 32–36)
MCV RBC AUTO: 94.5 FL — SIGNIFICANT CHANGE UP (ref 80–100)
PHOSPHATE SERPL-MCNC: 3.3 MG/DL — SIGNIFICANT CHANGE UP (ref 2.5–4.5)
PLATELET # BLD AUTO: 460 K/UL — HIGH (ref 150–400)
POTASSIUM SERPL-MCNC: 3.8 MMOL/L — SIGNIFICANT CHANGE UP (ref 3.5–5.3)
POTASSIUM SERPL-SCNC: 3.8 MMOL/L — SIGNIFICANT CHANGE UP (ref 3.5–5.3)
RBC # BLD: 2.91 M/UL — LOW (ref 3.8–5.2)
RBC # FLD: 12.4 % — SIGNIFICANT CHANGE UP (ref 10.3–16.9)
SODIUM SERPL-SCNC: 139 MMOL/L — SIGNIFICANT CHANGE UP (ref 135–145)
SPECIMEN SOURCE: SIGNIFICANT CHANGE UP
WBC # BLD: 14.8 K/UL — HIGH (ref 3.8–10.5)
WBC # FLD AUTO: 14.8 K/UL — HIGH (ref 3.8–10.5)

## 2018-12-15 RX ORDER — POTASSIUM CHLORIDE 20 MEQ
20 PACKET (EA) ORAL ONCE
Qty: 0 | Refills: 0 | Status: COMPLETED | OUTPATIENT
Start: 2018-12-15 | End: 2018-12-15

## 2018-12-15 RX ORDER — BENZOCAINE AND MENTHOL 5; 1 G/100ML; G/100ML
1 LIQUID ORAL ONCE
Qty: 0 | Refills: 0 | Status: COMPLETED | OUTPATIENT
Start: 2018-12-15 | End: 2018-12-15

## 2018-12-15 RX ADMIN — SODIUM CHLORIDE 120 MILLILITER(S): 9 INJECTION, SOLUTION INTRAVENOUS at 10:43

## 2018-12-15 RX ADMIN — BENZOCAINE AND MENTHOL 1 LOZENGE: 5; 1 LIQUID ORAL at 21:47

## 2018-12-15 RX ADMIN — HEPARIN SODIUM 5000 UNIT(S): 5000 INJECTION INTRAVENOUS; SUBCUTANEOUS at 13:45

## 2018-12-15 RX ADMIN — HEPARIN SODIUM 5000 UNIT(S): 5000 INJECTION INTRAVENOUS; SUBCUTANEOUS at 21:47

## 2018-12-15 RX ADMIN — PIPERACILLIN AND TAZOBACTAM 200 GRAM(S): 4; .5 INJECTION, POWDER, LYOPHILIZED, FOR SOLUTION INTRAVENOUS at 13:46

## 2018-12-15 RX ADMIN — PIPERACILLIN AND TAZOBACTAM 200 GRAM(S): 4; .5 INJECTION, POWDER, LYOPHILIZED, FOR SOLUTION INTRAVENOUS at 02:04

## 2018-12-15 RX ADMIN — SODIUM CHLORIDE 120 MILLILITER(S): 9 INJECTION, SOLUTION INTRAVENOUS at 21:48

## 2018-12-15 RX ADMIN — PIPERACILLIN AND TAZOBACTAM 200 GRAM(S): 4; .5 INJECTION, POWDER, LYOPHILIZED, FOR SOLUTION INTRAVENOUS at 19:10

## 2018-12-15 RX ADMIN — PIPERACILLIN AND TAZOBACTAM 200 GRAM(S): 4; .5 INJECTION, POWDER, LYOPHILIZED, FOR SOLUTION INTRAVENOUS at 08:00

## 2018-12-15 RX ADMIN — HEPARIN SODIUM 5000 UNIT(S): 5000 INJECTION INTRAVENOUS; SUBCUTANEOUS at 06:07

## 2018-12-15 RX ADMIN — Medication 20 MILLIEQUIVALENT(S): at 13:46

## 2018-12-15 NOTE — PROGRESS NOTE ADULT - ATTENDING COMMENTS
Feels better.  No fevers since drainage  Abd soft, minimal TTP   drain more SSF than yesterday's purulence    A/P: Diverticular abscess  1. Zosyn.  f/u cultures  2. OOB, IS  3. OK for regional bed  4. remove dudley (was inserted for urinary retention)  5. on clear liquid diet.

## 2018-12-15 NOTE — PROGRESS NOTE ADULT - SUBJECTIVE AND OBJECTIVE BOX
S:    Abdominal pain markedly improved, complains of pain from gluteal SAGE  Denies nausea/vomiting    O:  Vital Signs Last 24 Hrs  T(C): 37.4 (14 Dec 2018 23:38), Max: 39.3 (14 Dec 2018 13:58)  T(F): 99.3 (14 Dec 2018 23:38), Max: 102.7 (14 Dec 2018 13:58)  HR: 86 (14 Dec 2018 20:31) (86 - 112)  BP: 128/60 (14 Dec 2018 20:31) (104/48 - 129/87)  BP(mean): 87 (14 Dec 2018 20:31) (69 - 103)  RR: 17 (14 Dec 2018 20:31) (16 - 19)  SpO2: 94% (14 Dec 2018 20:31) (94% - 97%)    General: NAD, Comfortable in bed     Neuro: A&Ox3  Respiratory: nonlabored breathing, no respiratory distress    Abd: soft,  nondistended,   minimal TTP in LLQ without guarding or rebound.  gluteal SAGE serosanguinous  Extremities: WWP, nonedematous, SCDs in place S:    Abdominal pain markedly improved, complains of pain from gluteal SAGE  Denies nausea/vomiting    O:  Vital Signs Last 24 Hrs  T(C): 37.4 (14 Dec 2018 23:38), Max: 39.3 (14 Dec 2018 13:58)  T(F): 99.3 (14 Dec 2018 23:38), Max: 102.7 (14 Dec 2018 13:58)  HR: 86 (14 Dec 2018 20:31) (86 - 112)  BP: 128/60 (14 Dec 2018 20:31) (104/48 - 129/87)  BP(mean): 87 (14 Dec 2018 20:31) (69 - 103)  RR: 17 (14 Dec 2018 20:31) (16 - 19)  SpO2: 94% (14 Dec 2018 20:31) (94% - 97%)    General: NAD, Comfortable in bed     Neuro: A&Ox3  Respiratory: nonlabored breathing, no respiratory distress    Abd: soft,  nondistended,   minimal TTP in LLQ without guarding or rebound.  gluteal SAGE serosanguinous  : dudley in place with yellow urine  Extremities: WWP, nonedematous, SCDs in place

## 2018-12-15 NOTE — PROGRESS NOTE ADULT - ASSESSMENT
68 F w/ sigmoid diverticulitis and abscess s/p IR drainage.    CLD, advance pending resolution of pain  Step down to regional floor  Continue serial abdominal exams  Continue Zosyn  DC dudley, f/u TOV

## 2018-12-15 NOTE — PROGRESS NOTE ADULT - SUBJECTIVE AND OBJECTIVE BOX
SUBJECTIVE: Pt appears comfortable. Denies N/V, abdominal pain improving to mild pain around SAGE drain site. Passing flatus, endorses BM. Making appropriate UO.      heparin  Injectable 5000 Unit(s) SubCutaneous every 8 hours  piperacillin/tazobactam IVPB. 3.375 Gram(s) IV Intermittent every 6 hours      Vital Signs Last 24 Hrs  T(C): 36 (15 Dec 2018 20:30), Max: 37.4 (14 Dec 2018 23:38)  T(F): 96.8 (15 Dec 2018 20:30), Max: 99.3 (14 Dec 2018 23:38)  HR: 94 (15 Dec 2018 20:30) (88 - 94)  BP: 149/75 (15 Dec 2018 20:30) (118/58 - 149/75)  BP(mean): 93 (15 Dec 2018 16:21) (78 - 93)  RR: 16 (15 Dec 2018 20:30) (14 - 18)  SpO2: 96% (15 Dec 2018 20:30) (96% - 99%)    General: NAD, resting comfortably in bed  Pulm: Nonlabored breathing, no respiratory distress  Abd: soft, minimal LLQ tenderness. ND. SAGE SS  Extrem: WWP, no edema, SCDs in place      LABS:                        8.5    14.8  )-----------( 460      ( 15 Dec 2018 06:08 )             27.5     12-15    139  |  100  |  8   ----------------------------<  91  3.8   |  25  |  0.68    Ca    8.6      15 Dec 2018 06:08  Phos  3.3     12-15  Mg     2.2     12-15

## 2018-12-16 LAB
-  AMPICILLIN/SULBACTAM: SIGNIFICANT CHANGE UP
-  AMPICILLIN: SIGNIFICANT CHANGE UP
-  CEFAZOLIN: SIGNIFICANT CHANGE UP
-  CEFTRIAXONE: SIGNIFICANT CHANGE UP
-  CEFTRIAXONE: SIGNIFICANT CHANGE UP
-  CIPROFLOXACIN: SIGNIFICANT CHANGE UP
-  CLINDAMYCIN: SIGNIFICANT CHANGE UP
-  ERYTHROMYCIN: SIGNIFICANT CHANGE UP
-  GENTAMICIN: SIGNIFICANT CHANGE UP
-  LEVOFLOXACIN: SIGNIFICANT CHANGE UP
-  PENICILLIN: SIGNIFICANT CHANGE UP
-  PIPERACILLIN/TAZOBACTAM: SIGNIFICANT CHANGE UP
-  TOBRAMYCIN: SIGNIFICANT CHANGE UP
-  TRIMETHOPRIM/SULFAMETHOXAZOLE: SIGNIFICANT CHANGE UP
-  VANCOMYCIN: SIGNIFICANT CHANGE UP
ANION GAP SERPL CALC-SCNC: 15 MMOL/L — SIGNIFICANT CHANGE UP (ref 5–17)
BUN SERPL-MCNC: 5 MG/DL — LOW (ref 7–23)
C DIFF GDH STL QL: NEGATIVE — SIGNIFICANT CHANGE UP
C DIFF GDH STL QL: SIGNIFICANT CHANGE UP
CALCIUM SERPL-MCNC: 9.1 MG/DL — SIGNIFICANT CHANGE UP (ref 8.4–10.5)
CHLORIDE SERPL-SCNC: 102 MMOL/L — SIGNIFICANT CHANGE UP (ref 96–108)
CO2 SERPL-SCNC: 25 MMOL/L — SIGNIFICANT CHANGE UP (ref 22–31)
CREAT SERPL-MCNC: 0.7 MG/DL — SIGNIFICANT CHANGE UP (ref 0.5–1.3)
CULTURE RESULTS: SIGNIFICANT CHANGE UP
GLUCOSE SERPL-MCNC: 106 MG/DL — HIGH (ref 70–99)
HCT VFR BLD CALC: 29.8 % — LOW (ref 34.5–45)
HGB BLD-MCNC: 9.1 G/DL — LOW (ref 11.5–15.5)
MAGNESIUM SERPL-MCNC: 2.1 MG/DL — SIGNIFICANT CHANGE UP (ref 1.6–2.6)
MCHC RBC-ENTMCNC: 29 PG — SIGNIFICANT CHANGE UP (ref 27–34)
MCHC RBC-ENTMCNC: 30.5 G/DL — LOW (ref 32–36)
MCV RBC AUTO: 94.9 FL — SIGNIFICANT CHANGE UP (ref 80–100)
METHOD TYPE: SIGNIFICANT CHANGE UP
ORGANISM # SPEC MICROSCOPIC CNT: SIGNIFICANT CHANGE UP
PHOSPHATE SERPL-MCNC: 3 MG/DL — SIGNIFICANT CHANGE UP (ref 2.5–4.5)
PLATELET # BLD AUTO: 555 K/UL — HIGH (ref 150–400)
POTASSIUM SERPL-MCNC: 3.5 MMOL/L — SIGNIFICANT CHANGE UP (ref 3.5–5.3)
POTASSIUM SERPL-SCNC: 3.5 MMOL/L — SIGNIFICANT CHANGE UP (ref 3.5–5.3)
RBC # BLD: 3.14 M/UL — LOW (ref 3.8–5.2)
RBC # FLD: 12.5 % — SIGNIFICANT CHANGE UP (ref 10.3–16.9)
SODIUM SERPL-SCNC: 142 MMOL/L — SIGNIFICANT CHANGE UP (ref 135–145)
SPECIMEN SOURCE: SIGNIFICANT CHANGE UP
WBC # BLD: 10.4 K/UL — SIGNIFICANT CHANGE UP (ref 3.8–10.5)
WBC # FLD AUTO: 10.4 K/UL — SIGNIFICANT CHANGE UP (ref 3.8–10.5)

## 2018-12-16 RX ORDER — OXYCODONE AND ACETAMINOPHEN 5; 325 MG/1; MG/1
1 TABLET ORAL EVERY 6 HOURS
Qty: 0 | Refills: 0 | Status: DISCONTINUED | OUTPATIENT
Start: 2018-12-16 | End: 2018-12-16

## 2018-12-16 RX ORDER — SODIUM CHLORIDE 9 MG/ML
1000 INJECTION, SOLUTION INTRAVENOUS
Qty: 0 | Refills: 0 | Status: DISCONTINUED | OUTPATIENT
Start: 2018-12-16 | End: 2018-12-17

## 2018-12-16 RX ORDER — ACETAMINOPHEN 500 MG
650 TABLET ORAL EVERY 6 HOURS
Qty: 0 | Refills: 0 | Status: DISCONTINUED | OUTPATIENT
Start: 2018-12-16 | End: 2018-12-17

## 2018-12-16 RX ORDER — BENZOCAINE AND MENTHOL 5; 1 G/100ML; G/100ML
1 LIQUID ORAL ONCE
Qty: 0 | Refills: 0 | Status: COMPLETED | OUTPATIENT
Start: 2018-12-16 | End: 2018-12-16

## 2018-12-16 RX ORDER — OXYCODONE AND ACETAMINOPHEN 5; 325 MG/1; MG/1
2 TABLET ORAL EVERY 6 HOURS
Qty: 0 | Refills: 0 | Status: DISCONTINUED | OUTPATIENT
Start: 2018-12-16 | End: 2018-12-17

## 2018-12-16 RX ORDER — OXYCODONE AND ACETAMINOPHEN 5; 325 MG/1; MG/1
1 TABLET ORAL EVERY 6 HOURS
Qty: 0 | Refills: 0 | Status: DISCONTINUED | OUTPATIENT
Start: 2018-12-16 | End: 2018-12-17

## 2018-12-16 RX ORDER — POTASSIUM CHLORIDE 20 MEQ
40 PACKET (EA) ORAL ONCE
Qty: 0 | Refills: 0 | Status: COMPLETED | OUTPATIENT
Start: 2018-12-16 | End: 2018-12-16

## 2018-12-16 RX ADMIN — PIPERACILLIN AND TAZOBACTAM 200 GRAM(S): 4; .5 INJECTION, POWDER, LYOPHILIZED, FOR SOLUTION INTRAVENOUS at 12:19

## 2018-12-16 RX ADMIN — HEPARIN SODIUM 5000 UNIT(S): 5000 INJECTION INTRAVENOUS; SUBCUTANEOUS at 05:32

## 2018-12-16 RX ADMIN — Medication 650 MILLIGRAM(S): at 12:26

## 2018-12-16 RX ADMIN — PIPERACILLIN AND TAZOBACTAM 200 GRAM(S): 4; .5 INJECTION, POWDER, LYOPHILIZED, FOR SOLUTION INTRAVENOUS at 00:33

## 2018-12-16 RX ADMIN — HEPARIN SODIUM 5000 UNIT(S): 5000 INJECTION INTRAVENOUS; SUBCUTANEOUS at 14:03

## 2018-12-16 RX ADMIN — Medication 650 MILLIGRAM(S): at 13:30

## 2018-12-16 RX ADMIN — PIPERACILLIN AND TAZOBACTAM 200 GRAM(S): 4; .5 INJECTION, POWDER, LYOPHILIZED, FOR SOLUTION INTRAVENOUS at 23:41

## 2018-12-16 RX ADMIN — HEPARIN SODIUM 5000 UNIT(S): 5000 INJECTION INTRAVENOUS; SUBCUTANEOUS at 21:01

## 2018-12-16 RX ADMIN — BENZOCAINE AND MENTHOL 1 LOZENGE: 5; 1 LIQUID ORAL at 05:40

## 2018-12-16 RX ADMIN — Medication 40 MILLIEQUIVALENT(S): at 08:42

## 2018-12-16 RX ADMIN — Medication 650 MILLIGRAM(S): at 21:35

## 2018-12-16 RX ADMIN — SODIUM CHLORIDE 50 MILLILITER(S): 9 INJECTION, SOLUTION INTRAVENOUS at 23:41

## 2018-12-16 RX ADMIN — Medication 650 MILLIGRAM(S): at 20:56

## 2018-12-16 RX ADMIN — SODIUM CHLORIDE 50 MILLILITER(S): 9 INJECTION, SOLUTION INTRAVENOUS at 02:33

## 2018-12-16 RX ADMIN — PIPERACILLIN AND TAZOBACTAM 200 GRAM(S): 4; .5 INJECTION, POWDER, LYOPHILIZED, FOR SOLUTION INTRAVENOUS at 18:30

## 2018-12-16 RX ADMIN — PIPERACILLIN AND TAZOBACTAM 200 GRAM(S): 4; .5 INJECTION, POWDER, LYOPHILIZED, FOR SOLUTION INTRAVENOUS at 05:32

## 2018-12-16 RX ADMIN — SODIUM CHLORIDE 50 MILLILITER(S): 9 INJECTION, SOLUTION INTRAVENOUS at 08:43

## 2018-12-16 NOTE — PROGRESS NOTE ADULT - SUBJECTIVE AND OBJECTIVE BOX
68 F s/p IR drainage pelvic abscess 12/14.  Patient seen and examined during rounds. No acute complaints. Serosanguinous fluid noted in SAGE bulb.  Catheter flushed with 4 cc's NS easily.  Will continue to follow.    I&O's Detail    15 Dec 2018 07:01  -  16 Dec 2018 07:00  --------------------------------------------------------  IN:    dextrose 5% + sodium chloride 0.45%.: 200 mL    IV PiggyBack: 300 mL    lactated ringers.: 2040 mL    Oral Fluid: 120 mL  Total IN: 2660 mL    OUT:    Bulb: 27.5 mL    Indwelling Catheter - Urethral: 600 mL    Voided: 1900 mL  Total OUT: 2527.5 mL    Total NET: 132.5 mL      16 Dec 2018 07:01  -  16 Dec 2018 11:02  --------------------------------------------------------  IN:    dextrose 5% + sodium chloride 0.45%.: 100 mL    Oral Fluid: 480 mL  Total IN: 580 mL    OUT:    Voided: 200 mL  Total OUT: 200 mL    Total NET: 380 mL

## 2018-12-16 NOTE — PROGRESS NOTE ADULT - SUBJECTIVE AND OBJECTIVE BOX
SUBJECTIVE: Patient seen and examined bedside. Patient reports that she is feeling much better and her pain has significantly improved. Patient reports that she is passing flatus and has had multiple bowel movements. Patient denies nausea and vomiting.    heparin  Injectable 5000 Unit(s) SubCutaneous every 8 hours  piperacillin/tazobactam IVPB. 3.375 Gram(s) IV Intermittent every 6 hours      Vital Signs Last 24 Hrs  T(C): 36.8 (16 Dec 2018 08:48), Max: 37.3 (16 Dec 2018 00:33)  T(F): 98.3 (16 Dec 2018 08:48), Max: 99.1 (16 Dec 2018 00:33)  HR: 82 (16 Dec 2018 08:48) (82 - 94)  BP: 137/76 (16 Dec 2018 08:48) (120/74 - 149/75)  BP(mean): 93 (15 Dec 2018 16:21) (85 - 93)  RR: 16 (16 Dec 2018 08:48) (14 - 16)  SpO2: 97% (16 Dec 2018 08:48) (96% - 99%)  I&O's Detail    15 Dec 2018 07:01  -  16 Dec 2018 07:00  --------------------------------------------------------  IN:    dextrose 5% + sodium chloride 0.45%.: 200 mL    IV PiggyBack: 300 mL    lactated ringers.: 2040 mL    Oral Fluid: 120 mL  Total IN: 2660 mL    OUT:    Bulb: 27.5 mL    Indwelling Catheter - Urethral: 600 mL    Voided: 1900 mL  Total OUT: 2527.5 mL    Total NET: 132.5 mL      16 Dec 2018 07:01  -  16 Dec 2018 11:29  --------------------------------------------------------  IN:    dextrose 5% + sodium chloride 0.45%.: 100 mL    Oral Fluid: 480 mL  Total IN: 580 mL    OUT:    Voided: 200 mL  Total OUT: 200 mL    Total NET: 380 mL          General: NAD, resting comfortably in bed  C/V: NSR  Pulm: Nonlabored breathing, no respiratory distress  Abd: soft, NT/ND. SAGE x 1 serosanguinous   Extrem: WWP, no edema, SCDs in place        LABS:                        9.1    10.4  )-----------( 555      ( 16 Dec 2018 06:32 )             29.8     12-16    142  |  102  |  5<L>  ----------------------------<  106<H>  3.5   |  25  |  0.70    Ca    9.1      16 Dec 2018 06:32  Phos  3.0     12-16  Mg     2.1     12-16            RADIOLOGY & ADDITIONAL STUDIES:

## 2018-12-16 NOTE — PROGRESS NOTE ADULT - ASSESSMENT
68 F w/ sigmoid diverticulitis and abscess s/p IR drainage.    low residue diet  Continue serial abdominal exams  Continue Zosyn  SAGE drain care  r/o c diff  AM albs

## 2018-12-17 ENCOUNTER — TRANSCRIPTION ENCOUNTER (OUTPATIENT)
Age: 68
End: 2018-12-17

## 2018-12-17 ENCOUNTER — MOBILE ON CALL (OUTPATIENT)
Age: 68
End: 2018-12-17

## 2018-12-17 VITALS
SYSTOLIC BLOOD PRESSURE: 153 MMHG | RESPIRATION RATE: 16 BRPM | DIASTOLIC BLOOD PRESSURE: 75 MMHG | HEART RATE: 80 BPM | TEMPERATURE: 97 F | OXYGEN SATURATION: 96 %

## 2018-12-17 LAB
ANION GAP SERPL CALC-SCNC: 11 MMOL/L — SIGNIFICANT CHANGE UP (ref 5–17)
BUN SERPL-MCNC: 4 MG/DL — LOW (ref 7–23)
CALCIUM SERPL-MCNC: 9 MG/DL — SIGNIFICANT CHANGE UP (ref 8.4–10.5)
CHLORIDE SERPL-SCNC: 107 MMOL/L — SIGNIFICANT CHANGE UP (ref 96–108)
CO2 SERPL-SCNC: 25 MMOL/L — SIGNIFICANT CHANGE UP (ref 22–31)
CREAT SERPL-MCNC: 0.72 MG/DL — SIGNIFICANT CHANGE UP (ref 0.5–1.3)
GLUCOSE SERPL-MCNC: 101 MG/DL — HIGH (ref 70–99)
HCT VFR BLD CALC: 29.4 % — LOW (ref 34.5–45)
HGB BLD-MCNC: 9 G/DL — LOW (ref 11.5–15.5)
MAGNESIUM SERPL-MCNC: 2.1 MG/DL — SIGNIFICANT CHANGE UP (ref 1.6–2.6)
MCHC RBC-ENTMCNC: 29.1 PG — SIGNIFICANT CHANGE UP (ref 27–34)
MCHC RBC-ENTMCNC: 30.6 G/DL — LOW (ref 32–36)
MCV RBC AUTO: 95.1 FL — SIGNIFICANT CHANGE UP (ref 80–100)
PHOSPHATE SERPL-MCNC: 3 MG/DL — SIGNIFICANT CHANGE UP (ref 2.5–4.5)
PLATELET # BLD AUTO: 544 K/UL — HIGH (ref 150–400)
POTASSIUM SERPL-MCNC: 3.7 MMOL/L — SIGNIFICANT CHANGE UP (ref 3.5–5.3)
POTASSIUM SERPL-SCNC: 3.7 MMOL/L — SIGNIFICANT CHANGE UP (ref 3.5–5.3)
RBC # BLD: 3.09 M/UL — LOW (ref 3.8–5.2)
RBC # FLD: 12.6 % — SIGNIFICANT CHANGE UP (ref 10.3–16.9)
SODIUM SERPL-SCNC: 143 MMOL/L — SIGNIFICANT CHANGE UP (ref 135–145)
WBC # BLD: 9.4 K/UL — SIGNIFICANT CHANGE UP (ref 3.8–10.5)
WBC # FLD AUTO: 9.4 K/UL — SIGNIFICANT CHANGE UP (ref 3.8–10.5)

## 2018-12-17 PROCEDURE — 74177 CT ABD & PELVIS W/CONTRAST: CPT

## 2018-12-17 PROCEDURE — 49407 IMAGE CATH FLUID TRNS/VGNL: CPT

## 2018-12-17 PROCEDURE — 87324 CLOSTRIDIUM AG IA: CPT

## 2018-12-17 PROCEDURE — 87205 SMEAR GRAM STAIN: CPT

## 2018-12-17 PROCEDURE — 81003 URINALYSIS AUTO W/O SCOPE: CPT

## 2018-12-17 PROCEDURE — 77012 CT SCAN FOR NEEDLE BIOPSY: CPT

## 2018-12-17 PROCEDURE — 87184 SC STD DISK METHOD PER PLATE: CPT

## 2018-12-17 PROCEDURE — 87798 DETECT AGENT NOS DNA AMP: CPT

## 2018-12-17 PROCEDURE — 96374 THER/PROPH/DIAG INJ IV PUSH: CPT | Mod: XU

## 2018-12-17 PROCEDURE — 84100 ASSAY OF PHOSPHORUS: CPT

## 2018-12-17 PROCEDURE — 82962 GLUCOSE BLOOD TEST: CPT

## 2018-12-17 PROCEDURE — 85025 COMPLETE CBC W/AUTO DIFF WBC: CPT

## 2018-12-17 PROCEDURE — 87075 CULTR BACTERIA EXCEPT BLOOD: CPT

## 2018-12-17 PROCEDURE — 87449 NOS EACH ORGANISM AG IA: CPT

## 2018-12-17 PROCEDURE — 72129 CT CHEST SPINE W/DYE: CPT

## 2018-12-17 PROCEDURE — 72132 CT LUMBAR SPINE W/DYE: CPT

## 2018-12-17 PROCEDURE — 85027 COMPLETE CBC AUTOMATED: CPT

## 2018-12-17 PROCEDURE — 80048 BASIC METABOLIC PNL TOTAL CA: CPT

## 2018-12-17 PROCEDURE — 83605 ASSAY OF LACTIC ACID: CPT

## 2018-12-17 PROCEDURE — 87070 CULTURE OTHR SPECIMN AEROBIC: CPT

## 2018-12-17 PROCEDURE — 80053 COMPREHEN METABOLIC PANEL: CPT

## 2018-12-17 PROCEDURE — 36415 COLL VENOUS BLD VENIPUNCTURE: CPT

## 2018-12-17 PROCEDURE — 87086 URINE CULTURE/COLONY COUNT: CPT

## 2018-12-17 PROCEDURE — 83735 ASSAY OF MAGNESIUM: CPT

## 2018-12-17 PROCEDURE — 87581 M.PNEUMON DNA AMP PROBE: CPT

## 2018-12-17 PROCEDURE — 10160 PNXR ASPIR ABSC HMTMA BULLA: CPT

## 2018-12-17 PROCEDURE — 85610 PROTHROMBIN TIME: CPT

## 2018-12-17 PROCEDURE — 99285 EMERGENCY DEPT VISIT HI MDM: CPT | Mod: 25

## 2018-12-17 PROCEDURE — 87633 RESP VIRUS 12-25 TARGETS: CPT

## 2018-12-17 PROCEDURE — 85730 THROMBOPLASTIN TIME PARTIAL: CPT

## 2018-12-17 PROCEDURE — 87486 CHLMYD PNEUM DNA AMP PROBE: CPT

## 2018-12-17 PROCEDURE — 87040 BLOOD CULTURE FOR BACTERIA: CPT

## 2018-12-17 PROCEDURE — 87186 SC STD MICRODIL/AGAR DIL: CPT

## 2018-12-17 RX ORDER — POTASSIUM CHLORIDE 20 MEQ
40 PACKET (EA) ORAL ONCE
Qty: 0 | Refills: 0 | Status: COMPLETED | OUTPATIENT
Start: 2018-12-17 | End: 2018-12-17

## 2018-12-17 RX ORDER — DOCUSATE SODIUM 100 MG
1 CAPSULE ORAL
Qty: 6 | Refills: 0 | OUTPATIENT
Start: 2018-12-17 | End: 2018-12-19

## 2018-12-17 RX ADMIN — Medication 650 MILLIGRAM(S): at 09:53

## 2018-12-17 RX ADMIN — Medication 40 MILLIEQUIVALENT(S): at 11:30

## 2018-12-17 RX ADMIN — HEPARIN SODIUM 5000 UNIT(S): 5000 INJECTION INTRAVENOUS; SUBCUTANEOUS at 05:17

## 2018-12-17 RX ADMIN — PIPERACILLIN AND TAZOBACTAM 200 GRAM(S): 4; .5 INJECTION, POWDER, LYOPHILIZED, FOR SOLUTION INTRAVENOUS at 05:17

## 2018-12-17 RX ADMIN — Medication 650 MILLIGRAM(S): at 08:53

## 2018-12-17 NOTE — PROGRESS NOTE ADULT - SUBJECTIVE AND OBJECTIVE BOX
Pain resolved.  Tolerating low residue diet  No fevers  Abd soft, ND NT  Drain output low, serosanguinous fluid    Cultures sensitive to Zosyn, likely to Augmentin as well.  WBC normalized    A/P: Diverticulitis with abscess, drained. Much improved.  1. d/c home, fu with me 2-3 weeks.  2. Augmentin 875mg bid x 10d  3. Drain care, teaching.  4. She understands she will need resection in future.

## 2018-12-17 NOTE — DISCHARGE NOTE ADULT - PLAN OF CARE
Full recovery -Continue eating your regular low fiber diet as tolerated and drinking plenty of fluids.   -For pain, you may take over-the-counter Tylenol as labeled. For breakthrough pain you may take Percocet, which contains Tylenol. Do NOT exceed 4000mg acetaminophen/Tylenol total within 24 hours. Do NOT take Advil, Motrin, or NSAIDs. Please take Colace 1 tab twice daily while taking narcotic pain medication to avoid constipation.  -No heavy lifting >20 pounds or strenuous exercise.  -You may shower but NO baths and NO swimming. Keep your incisions clean & dry. Avoid a direct stream of water over incision sites. Do not scrub. Pat dry when done.  -You have been prescribed oral antibiotics. Please be sure to complete the entire course as directed.  -Please continue to empty your SAGE drain regularly and record the color and volume of the output. You will discuss this with your surgeon at your follow up appointment.  -Contact your doctor or go to the ER for fever > 101.5, chills, nausea, vomiting, chest pain, shortness of breath, pain not controlled by medication or excessive bleeding.  -Please follow up with Dr. Garcia in 2-3 weeks; you may call the office to make an appointment at your earliest convenience.

## 2018-12-17 NOTE — DISCHARGE NOTE ADULT - CARE PROVIDER_API CALL
Bronwyn Garcia), ColonRectal Surgery; Surgery  [address] [place] [address]  [address] [place] [address]  Phone: [phone]  Fax: [phone]

## 2018-12-17 NOTE — PROGRESS NOTE ADULT - REASON FOR ADMISSION
Acute sigmoid diverticulitis with pelvic abscess

## 2018-12-17 NOTE — DISCHARGE NOTE ADULT - PATIENT PORTAL LINK FT
You can access the [facility] Portal, offered by [facility], by registering with the following website: [email or web address]

## 2018-12-17 NOTE — PROGRESS NOTE ADULT - ASSESSMENT
68 F w/ sigmoid diverticulitis and abscess s/p IR drainage.    Pain/nausea prn  low residue diet  Continue Zosyn  SAGE drain care  SQH/SCDs/OOBA/IS  Outpatient abx plan

## 2018-12-17 NOTE — DISCHARGE NOTE ADULT - MEDICATION SUMMARY - MEDICATIONS TO TAKE
I will START or STAY ON the medications listed below when I get home from the hospital:    Percocet 5/325 oral tablet  -- 1 tab(s) by mouth every 6 hours, As Needed -for severe pain MDD:4   -- Caution federal law prohibits the transfer of this drug to any person other  than the person for whom it was prescribed.  May cause drowsiness.  Alcohol may intensify this effect.  Use care when operating dangerous machinery.  This prescription cannot be refilled.  This product contains acetaminophen.  Do not use  with any other product containing acetaminophen to prevent possible liver damage.  Using more of this medication than prescribed may cause serious breathing problems.    -- Indication: For Severe pain    Paxil 20 mg oral tablet  -- 1 tab(s) by mouth once a day  -- Indication: For Home meds    Ambien 10 mg oral tablet  -- 1 tab(s) by mouth once a day (at bedtime)  -- Indication: For Home meds    Colace 100 mg oral capsule  -- 1 cap(s) by mouth 2 times a day, As Needed -for constipation   -- Medication should be taken with plenty of water.    -- Indication: For Constipation    amoxicillin-clavulanate 875 mg-125 mg oral tablet  -- 1 tab(s) by mouth every 12 hours   -- Finish all this medication unless otherwise directed by prescriber.  Take with food or milk.    -- Indication: For Antibiotics

## 2018-12-17 NOTE — PROGRESS NOTE ADULT - SUBJECTIVE AND OBJECTIVE BOX
SUBJECTIVE: Pain controlled, only pain at drain site.  Patient seen and examined bedside by chief resident.    heparin  Injectable 5000 Unit(s) SubCutaneous every 8 hours  piperacillin/tazobactam IVPB. 3.375 Gram(s) IV Intermittent every 6 hours      Vital Signs Last 24 Hrs  T(C): 37.4 (17 Dec 2018 05:51), Max: 37.4 (17 Dec 2018 05:51)  T(F): 99.3 (17 Dec 2018 05:51), Max: 99.3 (17 Dec 2018 05:51)  HR: 85 (17 Dec 2018 05:51) (82 - 87)  BP: 139/86 (17 Dec 2018 05:51) (125/68 - 139/86)  BP(mean): --  RR: 16 (17 Dec 2018 05:51) (16 - 17)  SpO2: 100% (17 Dec 2018 05:51) (97% - 100%)  I&O's Detail    16 Dec 2018 07:01  -  17 Dec 2018 07:00  --------------------------------------------------------  IN:    dextrose 5% + sodium chloride 0.45%.: 900 mL    IV PiggyBack: 200 mL    Oral Fluid: 840 mL  Total IN: 1940 mL    OUT:    Bulb: 17.5 mL    Voided: 600 mL  Total OUT: 617.5 mL    Total NET: 1322.5 mL      17 Dec 2018 07:01  -  17 Dec 2018 08:39  --------------------------------------------------------  IN:  Total IN: 0 mL    OUT:    Voided: 200 mL  Total OUT: 200 mL    Total NET: -200 mL          General: NAD, resting comfortably in bed  C/V: NSR  Pulm: Nonlabored breathing, no respiratory distress  Abd: soft, NT/ND, SAGE w/ minimal output  Extrem: SCDs in place        LABS:                        9.0    9.4   )-----------( 544      ( 17 Dec 2018 06:25 )             29.4     12-17    143  |  107  |  4<L>  ----------------------------<  101<H>  3.7   |  25  |  0.72    Ca    9.0      17 Dec 2018 06:25  Phos  3.0     12-17  Mg     2.1     12-17            RADIOLOGY & ADDITIONAL STUDIES:

## 2018-12-18 LAB
CULTURE RESULTS: SIGNIFICANT CHANGE UP
CULTURE RESULTS: SIGNIFICANT CHANGE UP
SPECIMEN SOURCE: SIGNIFICANT CHANGE UP
SPECIMEN SOURCE: SIGNIFICANT CHANGE UP

## 2018-12-18 RX ORDER — OXYCODONE HYDROCHLORIDE AND ACETAMINOPHEN 5; 325 MG/1; MG/1
5-325 TABLET ORAL
Refills: 0 | Status: ACTIVE | COMMUNITY
Start: 2018-12-18

## 2018-12-18 RX ORDER — AMOXICILLIN AND CLAVULANATE POTASSIUM 875; 125 MG/1; 1/1
875-125 TABLET, FILM COATED ORAL
Refills: 0 | Status: ACTIVE | COMMUNITY

## 2018-12-19 DIAGNOSIS — K57.20 DIVERTICULITIS OF LARGE INTESTINE WITH PERFORATION AND ABSCESS WITHOUT BLEEDING: ICD-10-CM

## 2018-12-19 DIAGNOSIS — R33.9 RETENTION OF URINE, UNSPECIFIED: ICD-10-CM

## 2019-01-02 ENCOUNTER — MOBILE ON CALL (OUTPATIENT)
Age: 69
End: 2019-01-02

## 2019-01-07 RX ORDER — LORATADINE 5 MG
17 TABLET,CHEWABLE ORAL DAILY
Refills: 0 | Status: ACTIVE | COMMUNITY
Start: 2019-01-07

## 2019-01-07 RX ORDER — DOCUSATE SODIUM 100 MG/1
100 CAPSULE ORAL 3 TIMES DAILY
Refills: 0 | Status: ACTIVE | COMMUNITY
Start: 2019-01-07

## 2019-01-23 ENCOUNTER — EMERGENCY (EMERGENCY)
Facility: HOSPITAL | Age: 69
LOS: 1 days | Discharge: HOME | End: 2019-01-23
Attending: EMERGENCY MEDICINE | Admitting: EMERGENCY MEDICINE
Payer: MEDICARE

## 2019-01-23 VITALS
TEMPERATURE: 98 F | DIASTOLIC BLOOD PRESSURE: 76 MMHG | RESPIRATION RATE: 20 BRPM | HEART RATE: 73 BPM | SYSTOLIC BLOOD PRESSURE: 131 MMHG | HEIGHT: 64 IN | OXYGEN SATURATION: 96 % | WEIGHT: 138.01 LBS

## 2019-01-23 VITALS
SYSTOLIC BLOOD PRESSURE: 139 MMHG | TEMPERATURE: 98 F | DIASTOLIC BLOOD PRESSURE: 79 MMHG | HEART RATE: 72 BPM | RESPIRATION RATE: 18 BRPM | OXYGEN SATURATION: 100 %

## 2019-01-23 DIAGNOSIS — K59.00 CONSTIPATION, UNSPECIFIED: ICD-10-CM

## 2019-01-23 DIAGNOSIS — Z98.890 OTHER SPECIFIED POSTPROCEDURAL STATES: Chronic | ICD-10-CM

## 2019-01-23 DIAGNOSIS — Z90.49 ACQUIRED ABSENCE OF OTHER SPECIFIED PARTS OF DIGESTIVE TRACT: Chronic | ICD-10-CM

## 2019-01-23 DIAGNOSIS — Z90.89 ACQUIRED ABSENCE OF OTHER ORGANS: Chronic | ICD-10-CM

## 2019-01-23 DIAGNOSIS — R10.9 UNSPECIFIED ABDOMINAL PAIN: ICD-10-CM

## 2019-01-23 DIAGNOSIS — Z90.710 ACQUIRED ABSENCE OF BOTH CERVIX AND UTERUS: Chronic | ICD-10-CM

## 2019-01-23 DIAGNOSIS — E78.5 HYPERLIPIDEMIA, UNSPECIFIED: ICD-10-CM

## 2019-01-23 DIAGNOSIS — Z79.2 LONG TERM (CURRENT) USE OF ANTIBIOTICS: ICD-10-CM

## 2019-01-23 DIAGNOSIS — R10.32 LEFT LOWER QUADRANT PAIN: ICD-10-CM

## 2019-01-23 DIAGNOSIS — Z88.2 ALLERGY STATUS TO SULFONAMIDES: ICD-10-CM

## 2019-01-23 DIAGNOSIS — Z79.899 OTHER LONG TERM (CURRENT) DRUG THERAPY: ICD-10-CM

## 2019-01-23 LAB
ALBUMIN SERPL ELPH-MCNC: 4.6 G/DL — SIGNIFICANT CHANGE UP (ref 3.3–5)
ALP SERPL-CCNC: 71 U/L — SIGNIFICANT CHANGE UP (ref 40–120)
ALT FLD-CCNC: 14 U/L — SIGNIFICANT CHANGE UP (ref 10–45)
ANION GAP SERPL CALC-SCNC: 10 MMOL/L — SIGNIFICANT CHANGE UP (ref 5–17)
APPEARANCE UR: CLEAR — SIGNIFICANT CHANGE UP
APTT BLD: 31.9 SEC — SIGNIFICANT CHANGE UP (ref 27.5–36.3)
AST SERPL-CCNC: 19 U/L — SIGNIFICANT CHANGE UP (ref 10–40)
BASOPHILS NFR BLD AUTO: 0.8 % — SIGNIFICANT CHANGE UP (ref 0–2)
BILIRUB SERPL-MCNC: 0.3 MG/DL — SIGNIFICANT CHANGE UP (ref 0.2–1.2)
BILIRUB UR-MCNC: NEGATIVE — SIGNIFICANT CHANGE UP
BUN SERPL-MCNC: 12 MG/DL — SIGNIFICANT CHANGE UP (ref 7–23)
CALCIUM SERPL-MCNC: 9.5 MG/DL — SIGNIFICANT CHANGE UP (ref 8.4–10.5)
CHLORIDE SERPL-SCNC: 103 MMOL/L — SIGNIFICANT CHANGE UP (ref 96–108)
CO2 SERPL-SCNC: 26 MMOL/L — SIGNIFICANT CHANGE UP (ref 22–31)
COLOR SPEC: YELLOW — SIGNIFICANT CHANGE UP
CREAT SERPL-MCNC: 0.67 MG/DL — SIGNIFICANT CHANGE UP (ref 0.5–1.3)
DIFF PNL FLD: NEGATIVE — SIGNIFICANT CHANGE UP
EOSINOPHIL NFR BLD AUTO: 4.6 % — SIGNIFICANT CHANGE UP (ref 0–6)
GLUCOSE SERPL-MCNC: 96 MG/DL — SIGNIFICANT CHANGE UP (ref 70–99)
GLUCOSE UR QL: NEGATIVE — SIGNIFICANT CHANGE UP
HCT VFR BLD CALC: 40.7 % — SIGNIFICANT CHANGE UP (ref 34.5–45)
HGB BLD-MCNC: 12.6 G/DL — SIGNIFICANT CHANGE UP (ref 11.5–15.5)
INR BLD: 0.94 — SIGNIFICANT CHANGE UP (ref 0.88–1.16)
KETONES UR-MCNC: NEGATIVE — SIGNIFICANT CHANGE UP
LACTATE SERPL-SCNC: 1.6 MMOL/L — SIGNIFICANT CHANGE UP (ref 0.5–2)
LEUKOCYTE ESTERASE UR-ACNC: NEGATIVE — SIGNIFICANT CHANGE UP
LYMPHOCYTES # BLD AUTO: 25.4 % — SIGNIFICANT CHANGE UP (ref 13–44)
MCHC RBC-ENTMCNC: 28.3 PG — SIGNIFICANT CHANGE UP (ref 27–34)
MCHC RBC-ENTMCNC: 31 G/DL — LOW (ref 32–36)
MCV RBC AUTO: 91.5 FL — SIGNIFICANT CHANGE UP (ref 80–100)
MONOCYTES NFR BLD AUTO: 6 % — SIGNIFICANT CHANGE UP (ref 2–14)
NEUTROPHILS NFR BLD AUTO: 63.2 % — SIGNIFICANT CHANGE UP (ref 43–77)
NITRITE UR-MCNC: NEGATIVE — SIGNIFICANT CHANGE UP
PH UR: 6 — SIGNIFICANT CHANGE UP (ref 5–8)
PLATELET # BLD AUTO: 300 K/UL — SIGNIFICANT CHANGE UP (ref 150–400)
POTASSIUM SERPL-MCNC: 4.1 MMOL/L — SIGNIFICANT CHANGE UP (ref 3.5–5.3)
POTASSIUM SERPL-SCNC: 4.1 MMOL/L — SIGNIFICANT CHANGE UP (ref 3.5–5.3)
PROT SERPL-MCNC: 7.4 G/DL — SIGNIFICANT CHANGE UP (ref 6–8.3)
PROT UR-MCNC: NEGATIVE MG/DL — SIGNIFICANT CHANGE UP
PROTHROM AB SERPL-ACNC: 10.6 SEC — SIGNIFICANT CHANGE UP (ref 10–12.9)
RBC # BLD: 4.45 M/UL — SIGNIFICANT CHANGE UP (ref 3.8–5.2)
RBC # FLD: 13.6 % — SIGNIFICANT CHANGE UP (ref 10.3–16.9)
SODIUM SERPL-SCNC: 139 MMOL/L — SIGNIFICANT CHANGE UP (ref 135–145)
SP GR SPEC: 1.01 — SIGNIFICANT CHANGE UP (ref 1–1.03)
UROBILINOGEN FLD QL: 0.2 E.U./DL — SIGNIFICANT CHANGE UP
WBC # BLD: 6.1 K/UL — SIGNIFICANT CHANGE UP (ref 3.8–10.5)
WBC # FLD AUTO: 6.1 K/UL — SIGNIFICANT CHANGE UP (ref 3.8–10.5)

## 2019-01-23 PROCEDURE — 87040 BLOOD CULTURE FOR BACTERIA: CPT

## 2019-01-23 PROCEDURE — 74177 CT ABD & PELVIS W/CONTRAST: CPT | Mod: 26

## 2019-01-23 PROCEDURE — 85025 COMPLETE CBC W/AUTO DIFF WBC: CPT

## 2019-01-23 PROCEDURE — 99284 EMERGENCY DEPT VISIT MOD MDM: CPT

## 2019-01-23 PROCEDURE — 81003 URINALYSIS AUTO W/O SCOPE: CPT

## 2019-01-23 PROCEDURE — 74177 CT ABD & PELVIS W/CONTRAST: CPT

## 2019-01-23 PROCEDURE — 99284 EMERGENCY DEPT VISIT MOD MDM: CPT | Mod: 25

## 2019-01-23 PROCEDURE — 83605 ASSAY OF LACTIC ACID: CPT

## 2019-01-23 PROCEDURE — 80053 COMPREHEN METABOLIC PANEL: CPT

## 2019-01-23 PROCEDURE — 85610 PROTHROMBIN TIME: CPT

## 2019-01-23 PROCEDURE — 85730 THROMBOPLASTIN TIME PARTIAL: CPT

## 2019-01-23 RX ORDER — HYDROMORPHONE HYDROCHLORIDE 2 MG/ML
0.5 INJECTION INTRAMUSCULAR; INTRAVENOUS; SUBCUTANEOUS ONCE
Qty: 0 | Refills: 0 | Status: DISCONTINUED | OUTPATIENT
Start: 2019-01-23 | End: 2019-01-23

## 2019-01-23 NOTE — ED ADULT TRIAGE NOTE - CHIEF COMPLAINT QUOTE
Patient c/o lower abdominal pain since sunday , denies any nausea , vomiting nor dysuria . Had abdominal abscess drained 1 mos ago .

## 2019-01-23 NOTE — ED PROVIDER NOTE - OBJECTIVE STATEMENT
68F HLD, open appendectomy, open BELÉN, right TKR admitted approximately 6 weeks ago for acute diverticulitis complicated by large 8cm pelvic abscess managed with IR drainage now p/w 4 days of LLQ pain. Her IR drain was removed 2 weeks ago. Her pain is described as 3/10, constant, and crampy. Patient currently on abx for dog bite. Patient has had intermittent diarrhea for the past week which she attributes to the abx she is currently taking. now feels more constipated. passing gas. Denies f/c/n/v/CP/SOB.

## 2019-01-23 NOTE — ED ADULT NURSE NOTE - OBJECTIVE STATEMENT
Patient presents to the ED with lower abdominal pain that started a few days ago.  Patient was admitted a few weeks ago with a complicated diverticulitis and had an abscess drained.  States pain feels similar.  Denies fever, chills.  Patient tender to lower abdomen.

## 2019-01-23 NOTE — CONSULT NOTE ADULT - SUBJECTIVE AND OBJECTIVE BOX
68F HLD, open appendectomy, open BELÉN, right TKR admitted approximately 6 weeks ago for acute diverticulitis managed with IR drainage now p/w 4 days of LLQ pain. Her IR drain was removed 2 weeks ago. Her pain is described as 3/10, constant, and crampy. Patient currently on abx for dog bite. Patient has had intermittent diarrhea for the past week which she attributes to the abx she is currently taking. Denies f/c/n/v/CP/SOB.    Vital Signs Last 24 Hrs  T(C): 36.8 (23 Jan 2019 14:53), Max: 36.8 (23 Jan 2019 14:53)  T(F): 98.2 (23 Jan 2019 14:53), Max: 98.2 (23 Jan 2019 14:53)  HR: 72 (23 Jan 2019 14:53) (72 - 73)  BP: 139/79 (23 Jan 2019 14:53) (131/76 - 139/79)  BP(mean): --  RR: 18 (23 Jan 2019 14:53) (18 - 20)  SpO2: 100% (23 Jan 2019 14:53) (96% - 100%)    Gen: NAD, AOx3  Pulm: Unlabored breathing, no respiratory distress  Abd: sND, mild ttp in LLQ                          12.6   6.1   )-----------( 300      ( 23 Jan 2019 12:54 )             40.7   23 Jan 2019 12:54    139    |  103    |  12     ----------------------------<  96     4.1     |  26     |  0.67     Ca    9.5        23 Jan 2019 12:54    TPro  7.4    /  Alb  4.6    /  TBili  0.3    /  DBili  x      /  AST  19     /  ALT  14     /  AlkPhos  71     23 Jan 2019 12:54  PT/INR - ( 23 Jan 2019 12:54 )   PT: 10.6 sec;   INR: 0.94          PTT - ( 23 Jan 2019 12:54 )  PTT:31.9 sec    < from: CT Abdomen and Pelvis w/ IV Cont (01.23.19 @ 13:35) >  INTERPRETATION:  CT of the ABDOMEN and PELVIS with intravenous contrast   dated 1/23/2019 12:19 PM    INDICATION: recent complicated diverticulitis     TECHNIQUE: CT of the abdomen and pelvis was performed with intravenous   contrast. Axial and coronal and sagittal images were produced and   reviewed.    CONTRAST USAGE: 100 mL of Optiray 350 injected.    Oral contrast: Not administered.    PRIOR STUDIES: CT abdomen and pelvis 12/13/2018.    FINDINGS: Images of the lower chest demonstrate no abnormality.    The liver is normal in appearance.  No radiopaque stones are seen in the   gallbladder.  The pancreas is normal in appearance.  No splenomegaly.  Tiny calcified splenic granulomata..    The adrenal glands are unremarkable. The kidneys are normal in   appearance.        No abdominal aortic aneurysm is seen. No lymphadenopathy is seen.     Evaluation of the bowel is limited due to lack of oralcontrast   material.. No ascites is seen.    Images of the pelvis demonstrate the patient is status post hysterectomy   including cervix. The ovaries are not identified on either side likely   status post bilateral oophorectomy. Scattered colonic diverticula. No   bowel obstruction. Resolution of the prior perforated sigmoid   diverticulitis with pericolic abscess. A ghost drainage tract noted on   the right through the greater sciatic notch.. No bladder mass or filling   defect. Probable mild bladder prolapse.. Small bilateral fat-containing   indirect inguinal hernias.    Evaluation of the osseous structures demonstrates     old asymmetric   wedge compression of superior endplate of L1 vertebral body.      IMPRESSION: Total resolution of the prior sigmoid diverticulitis with   pericolic abscess.    < end of copied text >

## 2019-01-23 NOTE — ED CLERICAL - NS ED CLERK NOTE PRE-ARRIVAL INFORMATION; ADDITIONAL PRE-ARRIVAL INFORMATION
This patient is enrolled in the comprehensive joint replacement (CJR) program and has active care navigation.  This patient can be followed up by the care navigation team within 24 hours. To arrange close follow-up or to obtain additional clinical information about this patient, please call the contact number above. Please call the orthopedic resident on call ([phone]) for ALL patients who may be admitted.

## 2019-01-23 NOTE — ED PROVIDER NOTE - MEDICAL DECISION MAKING DETAILS
here w/ recent diverticulitis complicated w/ perf and abscess s/p IR drainage w/ low abdominal pain. pt seen by attendign dr toney, surg team, and had ct abdomen/pelvis that was normal. DC home in NAD with strict return precautions given to both pt and her son in law. to follow up closely w/ her surgeon.

## 2019-01-23 NOTE — CONSULT NOTE ADULT - ASSESSMENT
68F HLD, open appendectomy, open BELÉN, right TKR admitted approximately 6 weeks ago for acute diverticulitis managed with IR drainage now p/w 4 days of LLQ pain    -No acute surgical intervention at this time, no need for surgical admission  -No need for antibiotics from surgical perspective  -Patient should followup with Dr. Garcia in 1-2 weeks  -seen with Dr. Garcia and d/w chief resident and Dr. Garcia

## 2019-01-23 NOTE — ED PROVIDER NOTE - NSFOLLOWUPINSTRUCTIONS_ED_ALL_ED_FT
Please follow up with Dr. Garcia. Call today to make an appointment for 1-2 weeks.     Abdominal Pain    Many things can cause abdominal pain. Many times, abdominal pain is not caused by a disease and will improve without treatment. Your health care provider will do a physical exam to determine if there is a dangerous cause of your pain; blood tests and imaging may help determine the cause of your pain. However, in many cases, no cause may be found and you may need further testing as an outpatient. Monitor your abdominal pain for any changes.     SEEK IMMEDIATE MEDICAL CARE IF YOU HAVE ANY OF THE FOLLOWING SYMPTOMS: worsening abdominal pain, uncontrollable vomiting, profuse diarrhea, inability to have bowel movements or pass gas, black or bloody stools, fever accompanying chest pain or back pain, or fainting. These symptoms may represent a serious problem that is an emergency. Do not wait to see if the symptoms will go away. Get medical help right away. Call 911 and do not drive yourself to the hospital.

## 2019-01-23 NOTE — ED PROVIDER NOTE - PHYSICAL EXAMINATION
CONSTITUTIONAL: Well-appearing; well-nourished; in no apparent distress.   HEAD: Normocephalic; atraumatic.   EYES:  conjunctiva and sclera clear  ENT: normal nose; no rhinorrhea; normal pharynx with no erythema or lesions.   NECK: Supple; non-tender;   CARDIOVASCULAR: Normal S1, S2; no murmurs, rubs, or gallops. Regular rate and rhythm.   RESPIRATORY: Breathing easily; breath sounds clear and equal bilaterally; no wheezes, rhonchi, or rales.  GI: Soft; non-distended; low abdominal tenderness to palpation; no palpable organomegaly.   EXT: No cyanosis or edema; N/V intact  SKIN: Normal for age and race; warm; dry; good turgor; no apparent lesions or rash.   NEURO: A & O x 3; face symmetric; grossly unremarkable.   PSYCHOLOGICAL: The patient’s mood and manner are appropriate.

## 2019-01-23 NOTE — ED PROVIDER NOTE - CARE PROVIDER_API CALL
Bronwyn Garcia), ColonRectal Surgery; Surgery  [address] [place] [address]  [address] [place] [address]  Phone: [phone]  Fax: [phone]

## 2019-01-23 NOTE — CONSULT NOTE ADULT - ATTENDING COMMENTS
Patient seen and examined with Dr. Mcarthur.  She has essentially no tenderness.  WBC normal  CT reviewed, no diverticulitis or abscess    Imp: Suspect colonic spasm due to recent diarrhea/constipation.    Plan: d/c home, finish course of antibiotic for dog bite.  f/u with me as planned.  Fiber supplementation.

## 2019-01-23 NOTE — ED ADULT NURSE NOTE - NSIMPLEMENTINTERV_GEN_ALL_ED
Implemented All Universal Safety Interventions:  [place] to call system. Call bell, personal items and telephone within reach. Instruct patient to call for assistance. Room bathroom lighting operational. Non-slip footwear when patient is off stretcher. Physically safe environment: no spills, clutter or unnecessary equipment. Stretcher in lowest position, wheels locked, appropriate side rails in place.

## 2019-01-24 ENCOUNTER — APPOINTMENT (OUTPATIENT)
Age: 69
End: 2019-01-24

## 2019-01-29 ENCOUNTER — MOBILE ON CALL (OUTPATIENT)
Age: 69
End: 2019-01-29

## 2019-02-01 ENCOUNTER — APPOINTMENT (OUTPATIENT)
Dept: HEART AND VASCULAR | Facility: CLINIC | Age: 69
End: 2019-02-01
Payer: MEDICARE

## 2019-02-01 VITALS
BODY MASS INDEX: 23.73 KG/M2 | OXYGEN SATURATION: 98 % | WEIGHT: 139 LBS | HEIGHT: 64 IN | TEMPERATURE: 98 F | SYSTOLIC BLOOD PRESSURE: 110 MMHG | DIASTOLIC BLOOD PRESSURE: 70 MMHG | HEART RATE: 80 BPM

## 2019-02-01 DIAGNOSIS — Z01.818 ENCOUNTER FOR OTHER PREPROCEDURAL EXAMINATION: ICD-10-CM

## 2019-02-01 DIAGNOSIS — Z87.42 PERSONAL HISTORY OF OTHER DISEASES OF THE FEMALE GENITAL TRACT: ICD-10-CM

## 2019-02-01 DIAGNOSIS — R94.31 ABNORMAL ELECTROCARDIOGRAM [ECG] [EKG]: ICD-10-CM

## 2019-02-01 DIAGNOSIS — K57.32 DIVERTICULITIS OF LARGE INTESTINE W/OUT PERFORATION OR ABSCESS W/OUT BLEEDING: ICD-10-CM

## 2019-02-01 PROCEDURE — 99214 OFFICE O/P EST MOD 30 MIN: CPT | Mod: 57

## 2019-02-01 RX ORDER — PAROXETINE HYDROCHLORIDE 20 MG/1
20 TABLET, FILM COATED ORAL
Refills: 0 | Status: ACTIVE | COMMUNITY

## 2019-02-01 RX ORDER — ZOLPIDEM TARTRATE 10 MG/1
10 TABLET, FILM COATED ORAL
Refills: 0 | Status: ACTIVE | COMMUNITY

## 2019-02-01 NOTE — PHYSICAL EXAM
[General Appearance - Well Developed] : well developed [Normal Appearance] : normal appearance [Well Groomed] : well groomed [General Appearance - Well Nourished] : well nourished [No Deformities] : no deformities [General Appearance - In No Acute Distress] : no acute distress [Normal Conjunctiva] : the conjunctiva exhibited no abnormalities [Eyelids - No Xanthelasma] : the eyelids demonstrated no xanthelasmas [Normal Oral Mucosa] : normal oral mucosa [No Oral Pallor] : no oral pallor [No Oral Cyanosis] : no oral cyanosis [Normal Jugular Venous A Waves Present] : normal jugular venous A waves present [Normal Jugular Venous V Waves Present] : normal jugular venous V waves present [No Jugular Venous Morrow A Waves] : no jugular venous morrow A waves [Respiration, Rhythm And Depth] : normal respiratory rhythm and effort [Exaggerated Use Of Accessory Muscles For Inspiration] : no accessory muscle use [Auscultation Breath Sounds / Voice Sounds] : lungs were clear to auscultation bilaterally [Heart Rate And Rhythm] : heart rate and rhythm were normal [Heart Sounds] : normal S1 and S2 [Murmurs] : no murmurs present [Arterial Pulses Normal] : the arterial pulses were normal [Edema] : no peripheral edema present [Bowel Sounds] : normal bowel sounds [Abdomen Soft] : soft [Abdomen Tenderness] : non-tender [Abdomen Mass (___ Cm)] : no abdominal mass palpated [Abnormal Walk] : normal gait [Gait - Sufficient For Exercise Testing] : the gait was sufficient for exercise testing [Nail Clubbing] : no clubbing of the fingernails [Cyanosis, Localized] : no localized cyanosis [Petechial Hemorrhages (___cm)] : no petechial hemorrhages [Nail Splinter Hemorrhages] : no splinter hemorrhages of the nails [Fingers Osler's Nodes] : Osler's nodes were not seenon the fingers [Skin Color & Pigmentation] : normal skin color and pigmentation [Skin Turgor] : normal skin turgor [] : no rash [No Venous Stasis] : no venous stasis [Skin Lesions] : no skin lesions [No Skin Ulcers] : no skin ulcer [No Xanthoma] : no  xanthoma was observed [Oriented To Time, Place, And Person] : oriented to person, place, and time [Impaired Insight] : insight and judgment were intact [Affect] : the affect was normal [Mood] : the mood was normal [No Anxiety] : not feeling anxious

## 2019-02-01 NOTE — HISTORY OF PRESENT ILLNESS
[Preoperative Visit] : for a medical evaluation prior to surgery [Scheduled Procedure ___] : a [unfilled] [Date of Surgery ___] : on [unfilled] [Surgeon Name ___] : surgeon: [unfilled] [FreeTextEntry1] : 68 year old female  with remote hx of diverticulitis  and developed a diverticular abscess in Mid December after a  right total knee replacement.  Required a drain placement and iv antibiotics  and oral antibiotics. Now afebrile. No rectal bleeding. Appetite is back to normal. \par \par No hx of diabetes, HTN, angina, MI, CHF, asthma\par \par Non smoker\par \par No syncope,  chest pain, hematuria, nausea, vomiting \par \par No hx of thrombophilia \par \par In [facility] from [place] as her  is being treated for pancreatic cancer at [facility] \par Lives in [place] \par

## 2019-02-01 NOTE — DISCUSSION/SUMMARY
[Procedure Low Risk] : the procedure risk is low [Patient Low Risk] : the patient is a low surgical risk [FreeTextEntry3] : advised to stop HRT one week prior to surgery  [FreeTextEntry1] : answered all questions \par \par \par Recent right total knee replacement was uncomplicated

## 2019-02-03 LAB
ALBUMIN SERPL ELPH-MCNC: 4.3 G/DL
ALP BLD-CCNC: 64 U/L
ALT SERPL-CCNC: 6 U/L
ANION GAP SERPL CALC-SCNC: 10 MMOL/L
APTT BLD: 32.9 SEC
AST SERPL-CCNC: 15 U/L
BASOPHILS # BLD AUTO: 0.05 K/UL
BASOPHILS NFR BLD AUTO: 0.8 %
BILIRUB SERPL-MCNC: 0.2 MG/DL
BUN SERPL-MCNC: 14 MG/DL
CALCIUM SERPL-MCNC: 9.7 MG/DL
CHLORIDE SERPL-SCNC: 105 MMOL/L
CO2 SERPL-SCNC: 25 MMOL/L
CREAT SERPL-MCNC: 0.71 MG/DL
EOSINOPHIL # BLD AUTO: 0.3 K/UL
EOSINOPHIL NFR BLD AUTO: 4.8 %
GLUCOSE SERPL-MCNC: 89 MG/DL
HBA1C MFR BLD HPLC: 4.7 %
HCT VFR BLD CALC: 40.2 %
HGB BLD-MCNC: 12 G/DL
IMM GRANULOCYTES NFR BLD AUTO: 0.2 %
INR PPP: 0.91 RATIO
LYMPHOCYTES # BLD AUTO: 1.66 K/UL
LYMPHOCYTES NFR BLD AUTO: 26.3 %
MAN DIFF?: NORMAL
MCHC RBC-ENTMCNC: 28.2 PG
MCHC RBC-ENTMCNC: 29.9 GM/DL
MCV RBC AUTO: 94.4 FL
MONOCYTES # BLD AUTO: 0.36 K/UL
MONOCYTES NFR BLD AUTO: 5.7 %
NEUTROPHILS # BLD AUTO: 3.92 K/UL
NEUTROPHILS NFR BLD AUTO: 62.2 %
PLATELET # BLD AUTO: 340 K/UL
POTASSIUM SERPL-SCNC: 4.3 MMOL/L
PROT SERPL-MCNC: 6.6 G/DL
PT BLD: 10.4 SEC
RBC # BLD: 4.26 M/UL
RBC # FLD: 14.5 %
SODIUM SERPL-SCNC: 140 MMOL/L
WBC # FLD AUTO: 6.3 K/UL

## 2019-02-07 VITALS
DIASTOLIC BLOOD PRESSURE: 59 MMHG | HEIGHT: 64 IN | OXYGEN SATURATION: 100 % | TEMPERATURE: 98 F | RESPIRATION RATE: 18 BRPM | SYSTOLIC BLOOD PRESSURE: 112 MMHG | HEART RATE: 77 BPM | WEIGHT: 131.4 LBS

## 2019-02-08 ENCOUNTER — RESULT REVIEW (OUTPATIENT)
Age: 69
End: 2019-02-08

## 2019-02-08 ENCOUNTER — INPATIENT (INPATIENT)
Facility: HOSPITAL | Age: 69
LOS: 2 days | Discharge: HOME | DRG: 331 | End: 2019-02-11
Attending: SURGERY | Admitting: SURGERY
Payer: MEDICARE

## 2019-02-08 DIAGNOSIS — Z90.89 ACQUIRED ABSENCE OF OTHER ORGANS: Chronic | ICD-10-CM

## 2019-02-08 DIAGNOSIS — Z98.890 OTHER SPECIFIED POSTPROCEDURAL STATES: Chronic | ICD-10-CM

## 2019-02-08 DIAGNOSIS — Z90.49 ACQUIRED ABSENCE OF OTHER SPECIFIED PARTS OF DIGESTIVE TRACT: Chronic | ICD-10-CM

## 2019-02-08 DIAGNOSIS — Z90.710 ACQUIRED ABSENCE OF BOTH CERVIX AND UTERUS: Chronic | ICD-10-CM

## 2019-02-08 LAB — GLUCOSE BLDC GLUCOMTR-MCNC: 95 MG/DL — SIGNIFICANT CHANGE UP (ref 70–99)

## 2019-02-08 RX ORDER — ALVIMOPAN 12 MG/1
12 CAPSULE ORAL ONCE
Qty: 0 | Refills: 0 | Status: COMPLETED | OUTPATIENT
Start: 2019-02-08 | End: 2019-02-08

## 2019-02-08 RX ORDER — GABAPENTIN 400 MG/1
600 CAPSULE ORAL ONCE
Qty: 0 | Refills: 0 | Status: COMPLETED | OUTPATIENT
Start: 2019-02-08 | End: 2019-02-08

## 2019-02-08 RX ORDER — NALOXONE HYDROCHLORIDE 4 MG/.1ML
0.1 SPRAY NASAL
Qty: 0 | Refills: 0 | Status: DISCONTINUED | OUTPATIENT
Start: 2019-02-08 | End: 2019-02-11

## 2019-02-08 RX ORDER — ALVIMOPAN 12 MG/1
12 CAPSULE ORAL
Qty: 0 | Refills: 0 | Status: DISCONTINUED | OUTPATIENT
Start: 2019-02-08 | End: 2019-02-11

## 2019-02-08 RX ORDER — BUPIVACAINE 13.3 MG/ML
20 INJECTION, SUSPENSION, LIPOSOMAL INFILTRATION ONCE
Qty: 0 | Refills: 0 | Status: DISCONTINUED | OUTPATIENT
Start: 2019-02-08 | End: 2019-02-11

## 2019-02-08 RX ORDER — HEPARIN SODIUM 5000 [USP'U]/ML
5000 INJECTION INTRAVENOUS; SUBCUTANEOUS ONCE
Qty: 0 | Refills: 0 | Status: COMPLETED | OUTPATIENT
Start: 2019-02-08 | End: 2019-02-08

## 2019-02-08 RX ORDER — SODIUM CHLORIDE 9 MG/ML
1000 INJECTION, SOLUTION INTRAVENOUS
Qty: 0 | Refills: 0 | Status: DISCONTINUED | OUTPATIENT
Start: 2019-02-08 | End: 2019-02-10

## 2019-02-08 RX ORDER — HYDROMORPHONE HYDROCHLORIDE 2 MG/ML
30 INJECTION INTRAMUSCULAR; INTRAVENOUS; SUBCUTANEOUS
Qty: 0 | Refills: 0 | Status: DISCONTINUED | OUTPATIENT
Start: 2019-02-08 | End: 2019-02-09

## 2019-02-08 RX ORDER — ACETAMINOPHEN 500 MG
1000 TABLET ORAL ONCE
Qty: 0 | Refills: 0 | Status: COMPLETED | OUTPATIENT
Start: 2019-02-08 | End: 2019-02-08

## 2019-02-08 RX ORDER — DOCUSATE SODIUM 100 MG
100 CAPSULE ORAL
Qty: 0 | Refills: 0 | Status: DISCONTINUED | OUTPATIENT
Start: 2019-02-08 | End: 2019-02-11

## 2019-02-08 RX ORDER — HEPARIN SODIUM 5000 [USP'U]/ML
5000 INJECTION INTRAVENOUS; SUBCUTANEOUS EVERY 8 HOURS
Qty: 0 | Refills: 0 | Status: DISCONTINUED | OUTPATIENT
Start: 2019-02-08 | End: 2019-02-11

## 2019-02-08 RX ORDER — ZOLPIDEM TARTRATE 10 MG/1
1 TABLET ORAL
Qty: 0 | Refills: 0 | COMMUNITY

## 2019-02-08 RX ORDER — ONDANSETRON 8 MG/1
4 TABLET, FILM COATED ORAL EVERY 6 HOURS
Qty: 0 | Refills: 0 | Status: DISCONTINUED | OUTPATIENT
Start: 2019-02-08 | End: 2019-02-11

## 2019-02-08 RX ADMIN — HEPARIN SODIUM 5000 UNIT(S): 5000 INJECTION INTRAVENOUS; SUBCUTANEOUS at 22:00

## 2019-02-08 RX ADMIN — SODIUM CHLORIDE 80 MILLILITER(S): 9 INJECTION, SOLUTION INTRAVENOUS at 15:12

## 2019-02-08 RX ADMIN — ALVIMOPAN 12 MILLIGRAM(S): 12 CAPSULE ORAL at 22:14

## 2019-02-08 RX ADMIN — HYDROMORPHONE HYDROCHLORIDE 30 MILLILITER(S): 2 INJECTION INTRAMUSCULAR; INTRAVENOUS; SUBCUTANEOUS at 14:05

## 2019-02-08 RX ADMIN — ALVIMOPAN 12 MILLIGRAM(S): 12 CAPSULE ORAL at 07:35

## 2019-02-08 RX ADMIN — Medication 1000 MILLIGRAM(S): at 07:35

## 2019-02-08 RX ADMIN — HEPARIN SODIUM 5000 UNIT(S): 5000 INJECTION INTRAVENOUS; SUBCUTANEOUS at 07:35

## 2019-02-08 RX ADMIN — GABAPENTIN 600 MILLIGRAM(S): 400 CAPSULE ORAL at 07:36

## 2019-02-08 NOTE — BRIEF OPERATIVE NOTE - PROCEDURE
<<-----Click on this checkbox to enter Procedure Lysis of adhesions  02/08/2019    Active  NALPER  Indocyanine green (ICG) fluorescence angiography  02/08/2019    Active  NALPER  Colectomy, sigmoid, laparoscopic, hand-assisted  02/08/2019    Active  KENNPER

## 2019-02-08 NOTE — BRIEF OPERATIVE NOTE - OPERATION/FINDINGS
Laparoscopy revealed small amount of adhesions in RLQ which were taken down with sharp and blunt dissection. Dense adhesions noted in area of sigmoid colon consistent with prior known diverticular disease. Hand assisted sigmoidectomy performed after mobilization of splenic flexure. Good perfusion of proximal staple line confirmed with ICG fluorescence angiography. 29mm EEA created. Colonoscopy revealed intact anastomosis without bleeding and air leak test was negative.

## 2019-02-08 NOTE — H&P ADULT - ASSESSMENT
68F with history of perforated diverticulitis with pelvic abscess presents electively for laparoscopic sigmoidectomy.   - Admit to surgery, regional bed  - OR for above procedure

## 2019-02-08 NOTE — H&P ADULT - PSH
H/O knee surgery  right; TKR 2018  H/O shoulder surgery  right rotator  H/O: hysterectomy    History of appendectomy    History of tonsillectomy

## 2019-02-08 NOTE — H&P ADULT - HISTORY OF PRESENT ILLNESS
Patient is a 68 year old female with history of acute sigmoid diverticulitis with perforation managed with IV antibiotics and IR drainage of pelvic abscess. She has a history of hysterectomy and open appendectomy. She now presents electively for laparoscopic assisted sigmoidectomy. She has no complaints at present and underwent mechanical and antibiotic bowel prep two nights ago.

## 2019-02-08 NOTE — H&P ADULT - NSHPPHYSICALEXAM_GEN_ALL_CORE
Gen: AAOx3, NAD  Pulm: No respiratory distress, normal effort  Abd: Soft, NT/ND, well healed surgical incisions   Ext: No edema, WWP

## 2019-02-09 LAB
ANION GAP SERPL CALC-SCNC: 7 MMOL/L — SIGNIFICANT CHANGE UP (ref 5–17)
BUN SERPL-MCNC: 8 MG/DL — SIGNIFICANT CHANGE UP (ref 7–23)
CALCIUM SERPL-MCNC: 9 MG/DL — SIGNIFICANT CHANGE UP (ref 8.4–10.5)
CHLORIDE SERPL-SCNC: 105 MMOL/L — SIGNIFICANT CHANGE UP (ref 96–108)
CO2 SERPL-SCNC: 27 MMOL/L — SIGNIFICANT CHANGE UP (ref 22–31)
CREAT SERPL-MCNC: 0.64 MG/DL — SIGNIFICANT CHANGE UP (ref 0.5–1.3)
GLUCOSE SERPL-MCNC: 103 MG/DL — HIGH (ref 70–99)
HCT VFR BLD CALC: 33.4 % — LOW (ref 34.5–45)
HGB BLD-MCNC: 10.5 G/DL — LOW (ref 11.5–15.5)
MAGNESIUM SERPL-MCNC: 1.7 MG/DL — SIGNIFICANT CHANGE UP (ref 1.6–2.6)
MCHC RBC-ENTMCNC: 29.2 PG — SIGNIFICANT CHANGE UP (ref 27–34)
MCHC RBC-ENTMCNC: 31.4 GM/DL — LOW (ref 32–36)
MCV RBC AUTO: 93 FL — SIGNIFICANT CHANGE UP (ref 80–100)
NRBC # BLD: 0 /100 WBCS — SIGNIFICANT CHANGE UP (ref 0–0)
PHOSPHATE SERPL-MCNC: 3.2 MG/DL — SIGNIFICANT CHANGE UP (ref 2.5–4.5)
PLATELET # BLD AUTO: 248 K/UL — SIGNIFICANT CHANGE UP (ref 150–400)
POTASSIUM SERPL-MCNC: 3.7 MMOL/L — SIGNIFICANT CHANGE UP (ref 3.5–5.3)
POTASSIUM SERPL-SCNC: 3.7 MMOL/L — SIGNIFICANT CHANGE UP (ref 3.5–5.3)
RBC # BLD: 3.59 M/UL — LOW (ref 3.8–5.2)
RBC # FLD: 13.2 % — SIGNIFICANT CHANGE UP (ref 10.3–14.5)
SODIUM SERPL-SCNC: 139 MMOL/L — SIGNIFICANT CHANGE UP (ref 135–145)
WBC # BLD: 13.45 K/UL — HIGH (ref 3.8–10.5)
WBC # FLD AUTO: 13.45 K/UL — HIGH (ref 3.8–10.5)

## 2019-02-09 RX ORDER — POTASSIUM CHLORIDE 20 MEQ
10 PACKET (EA) ORAL
Qty: 0 | Refills: 0 | Status: COMPLETED | OUTPATIENT
Start: 2019-02-09 | End: 2019-02-10

## 2019-02-09 RX ORDER — KETOROLAC TROMETHAMINE 30 MG/ML
15 SYRINGE (ML) INJECTION EVERY 6 HOURS
Qty: 0 | Refills: 0 | Status: DISCONTINUED | OUTPATIENT
Start: 2019-02-09 | End: 2019-02-11

## 2019-02-09 RX ORDER — OXYCODONE AND ACETAMINOPHEN 5; 325 MG/1; MG/1
2 TABLET ORAL EVERY 4 HOURS
Qty: 0 | Refills: 0 | Status: DISCONTINUED | OUTPATIENT
Start: 2019-02-09 | End: 2019-02-11

## 2019-02-09 RX ORDER — HYDROMORPHONE HYDROCHLORIDE 2 MG/ML
0.5 INJECTION INTRAMUSCULAR; INTRAVENOUS; SUBCUTANEOUS EVERY 4 HOURS
Qty: 0 | Refills: 0 | Status: DISCONTINUED | OUTPATIENT
Start: 2019-02-09 | End: 2019-02-11

## 2019-02-09 RX ORDER — OXYCODONE AND ACETAMINOPHEN 5; 325 MG/1; MG/1
1 TABLET ORAL EVERY 4 HOURS
Qty: 0 | Refills: 0 | Status: DISCONTINUED | OUTPATIENT
Start: 2019-02-09 | End: 2019-02-11

## 2019-02-09 RX ORDER — MAGNESIUM SULFATE 500 MG/ML
2 VIAL (ML) INJECTION ONCE
Qty: 0 | Refills: 0 | Status: COMPLETED | OUTPATIENT
Start: 2019-02-09 | End: 2019-02-09

## 2019-02-09 RX ADMIN — HEPARIN SODIUM 5000 UNIT(S): 5000 INJECTION INTRAVENOUS; SUBCUTANEOUS at 23:07

## 2019-02-09 RX ADMIN — Medication 15 MILLIGRAM(S): at 15:40

## 2019-02-09 RX ADMIN — Medication 15 MILLIGRAM(S): at 19:51

## 2019-02-09 RX ADMIN — Medication 100 MILLIGRAM(S): at 19:21

## 2019-02-09 RX ADMIN — Medication 15 MILLIGRAM(S): at 15:10

## 2019-02-09 RX ADMIN — Medication 50 GRAM(S): at 19:49

## 2019-02-09 RX ADMIN — Medication 100 MILLIEQUIVALENT(S): at 22:41

## 2019-02-09 RX ADMIN — ALVIMOPAN 12 MILLIGRAM(S): 12 CAPSULE ORAL at 05:15

## 2019-02-09 RX ADMIN — HEPARIN SODIUM 5000 UNIT(S): 5000 INJECTION INTRAVENOUS; SUBCUTANEOUS at 15:10

## 2019-02-09 RX ADMIN — ALVIMOPAN 12 MILLIGRAM(S): 12 CAPSULE ORAL at 19:21

## 2019-02-09 RX ADMIN — HEPARIN SODIUM 5000 UNIT(S): 5000 INJECTION INTRAVENOUS; SUBCUTANEOUS at 06:00

## 2019-02-09 RX ADMIN — Medication 100 MILLIGRAM(S): at 05:15

## 2019-02-09 RX ADMIN — Medication 15 MILLIGRAM(S): at 19:21

## 2019-02-09 RX ADMIN — SODIUM CHLORIDE 80 MILLILITER(S): 9 INJECTION, SOLUTION INTRAVENOUS at 22:41

## 2019-02-09 RX ADMIN — Medication 100 MILLIEQUIVALENT(S): at 23:07

## 2019-02-09 RX ADMIN — Medication 20 MILLIGRAM(S): at 11:24

## 2019-02-10 LAB
ALBUMIN SERPL ELPH-MCNC: 3.1 G/DL — LOW (ref 3.3–5)
ALP SERPL-CCNC: 51 U/L — SIGNIFICANT CHANGE UP (ref 40–120)
ALT FLD-CCNC: 10 U/L — SIGNIFICANT CHANGE UP (ref 10–45)
ANION GAP SERPL CALC-SCNC: 7 MMOL/L — SIGNIFICANT CHANGE UP (ref 5–17)
AST SERPL-CCNC: 14 U/L — SIGNIFICANT CHANGE UP (ref 10–40)
BILIRUB SERPL-MCNC: 0.4 MG/DL — SIGNIFICANT CHANGE UP (ref 0.2–1.2)
BUN SERPL-MCNC: 5 MG/DL — LOW (ref 7–23)
CALCIUM SERPL-MCNC: 8.8 MG/DL — SIGNIFICANT CHANGE UP (ref 8.4–10.5)
CHLORIDE SERPL-SCNC: 109 MMOL/L — HIGH (ref 96–108)
CO2 SERPL-SCNC: 26 MMOL/L — SIGNIFICANT CHANGE UP (ref 22–31)
CREAT SERPL-MCNC: 0.58 MG/DL — SIGNIFICANT CHANGE UP (ref 0.5–1.3)
GLUCOSE SERPL-MCNC: 100 MG/DL — HIGH (ref 70–99)
HCT VFR BLD CALC: 29.5 % — LOW (ref 34.5–45)
HGB BLD-MCNC: 9.1 G/DL — LOW (ref 11.5–15.5)
MAGNESIUM SERPL-MCNC: 2.2 MG/DL — SIGNIFICANT CHANGE UP (ref 1.6–2.6)
MCHC RBC-ENTMCNC: 29.1 PG — SIGNIFICANT CHANGE UP (ref 27–34)
MCHC RBC-ENTMCNC: 30.8 GM/DL — LOW (ref 32–36)
MCV RBC AUTO: 94.2 FL — SIGNIFICANT CHANGE UP (ref 80–100)
NRBC # BLD: 0 /100 WBCS — SIGNIFICANT CHANGE UP (ref 0–0)
PHOSPHATE SERPL-MCNC: 2.6 MG/DL — SIGNIFICANT CHANGE UP (ref 2.5–4.5)
PLATELET # BLD AUTO: 215 K/UL — SIGNIFICANT CHANGE UP (ref 150–400)
POTASSIUM SERPL-MCNC: 3.8 MMOL/L — SIGNIFICANT CHANGE UP (ref 3.5–5.3)
POTASSIUM SERPL-SCNC: 3.8 MMOL/L — SIGNIFICANT CHANGE UP (ref 3.5–5.3)
PROT SERPL-MCNC: 5.3 G/DL — LOW (ref 6–8.3)
RBC # BLD: 3.13 M/UL — LOW (ref 3.8–5.2)
RBC # FLD: 13.8 % — SIGNIFICANT CHANGE UP (ref 10.3–14.5)
SODIUM SERPL-SCNC: 142 MMOL/L — SIGNIFICANT CHANGE UP (ref 135–145)
WBC # BLD: 7.81 K/UL — SIGNIFICANT CHANGE UP (ref 3.8–10.5)
WBC # FLD AUTO: 7.81 K/UL — SIGNIFICANT CHANGE UP (ref 3.8–10.5)

## 2019-02-10 RX ORDER — POTASSIUM CHLORIDE 20 MEQ
20 PACKET (EA) ORAL ONCE
Qty: 0 | Refills: 0 | Status: COMPLETED | OUTPATIENT
Start: 2019-02-10 | End: 2019-02-10

## 2019-02-10 RX ORDER — POTASSIUM PHOSPHATE, MONOBASIC POTASSIUM PHOSPHATE, DIBASIC 236; 224 MG/ML; MG/ML
15 INJECTION, SOLUTION INTRAVENOUS ONCE
Qty: 0 | Refills: 0 | Status: COMPLETED | OUTPATIENT
Start: 2019-02-10 | End: 2019-02-10

## 2019-02-10 RX ADMIN — HEPARIN SODIUM 5000 UNIT(S): 5000 INJECTION INTRAVENOUS; SUBCUTANEOUS at 07:38

## 2019-02-10 RX ADMIN — POTASSIUM PHOSPHATE, MONOBASIC POTASSIUM PHOSPHATE, DIBASIC 62.5 MILLIMOLE(S): 236; 224 INJECTION, SOLUTION INTRAVENOUS at 11:37

## 2019-02-10 RX ADMIN — Medication 100 MILLIGRAM(S): at 17:29

## 2019-02-10 RX ADMIN — Medication 15 MILLIGRAM(S): at 12:14

## 2019-02-10 RX ADMIN — OXYCODONE AND ACETAMINOPHEN 1 TABLET(S): 5; 325 TABLET ORAL at 23:14

## 2019-02-10 RX ADMIN — Medication 15 MILLIGRAM(S): at 01:09

## 2019-02-10 RX ADMIN — Medication 100 MILLIEQUIVALENT(S): at 01:09

## 2019-02-10 RX ADMIN — Medication 15 MILLIGRAM(S): at 08:00

## 2019-02-10 RX ADMIN — Medication 20 MILLIEQUIVALENT(S): at 10:18

## 2019-02-10 RX ADMIN — ALVIMOPAN 12 MILLIGRAM(S): 12 CAPSULE ORAL at 10:18

## 2019-02-10 RX ADMIN — Medication 15 MILLIGRAM(S): at 13:00

## 2019-02-10 RX ADMIN — Medication 15 MILLIGRAM(S): at 01:50

## 2019-02-10 RX ADMIN — Medication 100 MILLIGRAM(S): at 07:38

## 2019-02-10 RX ADMIN — HEPARIN SODIUM 5000 UNIT(S): 5000 INJECTION INTRAVENOUS; SUBCUTANEOUS at 17:29

## 2019-02-10 RX ADMIN — SODIUM CHLORIDE 80 MILLILITER(S): 9 INJECTION, SOLUTION INTRAVENOUS at 11:38

## 2019-02-10 RX ADMIN — ALVIMOPAN 12 MILLIGRAM(S): 12 CAPSULE ORAL at 22:25

## 2019-02-10 RX ADMIN — Medication 15 MILLIGRAM(S): at 17:29

## 2019-02-10 RX ADMIN — Medication 20 MILLIGRAM(S): at 11:37

## 2019-02-10 RX ADMIN — Medication 15 MILLIGRAM(S): at 07:38

## 2019-02-11 ENCOUNTER — TRANSCRIPTION ENCOUNTER (OUTPATIENT)
Age: 69
End: 2019-02-11

## 2019-02-11 VITALS
SYSTOLIC BLOOD PRESSURE: 144 MMHG | DIASTOLIC BLOOD PRESSURE: 71 MMHG | OXYGEN SATURATION: 98 % | RESPIRATION RATE: 17 BRPM | TEMPERATURE: 98 F | HEART RATE: 75 BPM

## 2019-02-11 LAB
ANION GAP SERPL CALC-SCNC: 6 MMOL/L — SIGNIFICANT CHANGE UP (ref 5–17)
BUN SERPL-MCNC: 5 MG/DL — LOW (ref 7–23)
CALCIUM SERPL-MCNC: 8.9 MG/DL — SIGNIFICANT CHANGE UP (ref 8.4–10.5)
CHLORIDE SERPL-SCNC: 107 MMOL/L — SIGNIFICANT CHANGE UP (ref 96–108)
CO2 SERPL-SCNC: 28 MMOL/L — SIGNIFICANT CHANGE UP (ref 22–31)
CREAT SERPL-MCNC: 0.68 MG/DL — SIGNIFICANT CHANGE UP (ref 0.5–1.3)
GLUCOSE SERPL-MCNC: 100 MG/DL — HIGH (ref 70–99)
HCT VFR BLD CALC: 31 % — LOW (ref 34.5–45)
HGB BLD-MCNC: 9.6 G/DL — LOW (ref 11.5–15.5)
MAGNESIUM SERPL-MCNC: 1.8 MG/DL — SIGNIFICANT CHANGE UP (ref 1.6–2.6)
MCHC RBC-ENTMCNC: 29.4 PG — SIGNIFICANT CHANGE UP (ref 27–34)
MCHC RBC-ENTMCNC: 31 GM/DL — LOW (ref 32–36)
MCV RBC AUTO: 94.8 FL — SIGNIFICANT CHANGE UP (ref 80–100)
NRBC # BLD: 0 /100 WBCS — SIGNIFICANT CHANGE UP (ref 0–0)
PHOSPHATE SERPL-MCNC: 3.6 MG/DL — SIGNIFICANT CHANGE UP (ref 2.5–4.5)
PLATELET # BLD AUTO: 228 K/UL — SIGNIFICANT CHANGE UP (ref 150–400)
POTASSIUM SERPL-MCNC: 4.4 MMOL/L — SIGNIFICANT CHANGE UP (ref 3.5–5.3)
POTASSIUM SERPL-SCNC: 4.4 MMOL/L — SIGNIFICANT CHANGE UP (ref 3.5–5.3)
RBC # BLD: 3.27 M/UL — LOW (ref 3.8–5.2)
RBC # FLD: 13.4 % — SIGNIFICANT CHANGE UP (ref 10.3–14.5)
SODIUM SERPL-SCNC: 141 MMOL/L — SIGNIFICANT CHANGE UP (ref 135–145)
WBC # BLD: 7.33 K/UL — SIGNIFICANT CHANGE UP (ref 3.8–10.5)
WBC # FLD AUTO: 7.33 K/UL — SIGNIFICANT CHANGE UP (ref 3.8–10.5)

## 2019-02-11 RX ORDER — MAGNESIUM SULFATE 500 MG/ML
1 VIAL (ML) INJECTION ONCE
Qty: 0 | Refills: 0 | Status: COMPLETED | OUTPATIENT
Start: 2019-02-11 | End: 2019-02-11

## 2019-02-11 RX ORDER — DOCUSATE SODIUM 100 MG
1 CAPSULE ORAL
Qty: 6 | Refills: 0 | OUTPATIENT
Start: 2019-02-11 | End: 2019-02-13

## 2019-02-11 RX ADMIN — ALVIMOPAN 12 MILLIGRAM(S): 12 CAPSULE ORAL at 06:14

## 2019-02-11 RX ADMIN — Medication 15 MILLIGRAM(S): at 06:14

## 2019-02-11 RX ADMIN — Medication 100 MILLIGRAM(S): at 06:15

## 2019-02-11 RX ADMIN — OXYCODONE AND ACETAMINOPHEN 1 TABLET(S): 5; 325 TABLET ORAL at 00:14

## 2019-02-11 RX ADMIN — HEPARIN SODIUM 5000 UNIT(S): 5000 INJECTION INTRAVENOUS; SUBCUTANEOUS at 06:14

## 2019-02-11 NOTE — DISCHARGE NOTE ADULT - PLAN OF CARE
Recovery -Continue eating your low fiber diet as tolerated and drinking plenty of fluids.   -For pain, you may take over-the-counter Tylenol and/or NSAIDs (such as motrin/advil) as labeled, as needed. For breakthrough pain you may take Percocet, which contains Tylenol. Do NOT exceed 4000mg acetaminophen/Tylenol total within 24 hours. Please take Colace 1 tab twice daily while taking narcotic pain medication to avoid constipation.  -No heavy lifting >20 pounds or strenuous exercise.  -You may shower but NO baths and NO swimming. Keep your incisions clean & dry. Avoid a direct stream of water over incision sites. Do not scrub. Pat dry when done.  -Contact your doctor or go to the ER for fever > 101.5, chills, nausea, vomiting, chest pain, shortness of breath, pain not controlled by medication or excessive bleeding.  -Please follow up with Dr. Garcia in 2 weeks; you may call the office to make an appointment at your earliest convenience.

## 2019-02-11 NOTE — PROGRESS NOTE ADULT - SUBJECTIVE AND OBJECTIVE BOX
SUBJECTIVE: Patient appears comfortable, pain is well controlled. Tolerating diet, denies N/V. Passing flatus, had a bowel movement. OOB, making appropriate UO.     heparin  Injectable 5000 Unit(s) SubCutaneous every 8 hours      Vital Signs Last 24 Hrs  T(C): 37.2 (10 Feb 2019 06:04), Max: 37.4 (09 Feb 2019 16:39)  T(F): 99 (10 Feb 2019 06:04), Max: 99.4 (09 Feb 2019 16:39)  HR: 75 (10 Feb 2019 06:04) (75 - 92)  BP: 126/73 (10 Feb 2019 06:04) (101/59 - 134/71)  BP(mean): --  RR: 16 (10 Feb 2019 06:04) (15 - 17)  SpO2: 98% (10 Feb 2019 06:04) (97% - 98%)    General: NAD, resting comfortably in bed  Pulm: Nonlabored breathing, no respiratory distress  Abd: soft, appropriately tender, ND. Inc c/d/i  Extrem: WWP, no edema, SCDs in place      LABS:                        9.1    7.81  )-----------( 215      ( 10 Feb 2019 06:38 )             29.5     02-10    142  |  109<H>  |  5<L>  ----------------------------<  100<H>  3.8   |  26  |  0.58    Ca    8.8      10 Feb 2019 06:38  Phos  2.6     02-10  Mg     2.2     02-10    TPro  5.3<L>  /  Alb  3.1<L>  /  TBili  0.4  /  DBili  x   /  AST  14  /  ALT  10  /  AlkPhos  51  02-10
Feels "gas." Not much pain  AFVSS  Abd soft, min dist, dressings dry  u/o excellent    A/P: POD 1 lap sigmoid resection, doing well  1. continue clear liquids  2. OOB, IS  3. d/c dudley
No complaints  Tolerating LRD  Passing flatus  AFVSS  Abd soft, min dist, incisions clean    A/P: POD 3 lap sigmoid colectomy, doing well.  1. d/c home, f/u with me 2 weeks.  2. Instructed on postop care.
OVERNIGHT EVENTS: VSS, JEWEL, ARBF  2/8 laparoscopic sigmoidectomy, primary EEA anastamosis, POC WNL, VSS, JEWEL    STATUS POST:  laparoscopic sigmoidectomy, primary EEA anastamosis    POST OPERATIVE DAY #: 2    SUBJECTIVE: Patient examined bedside with chief resident. Patient complains of incisional pain and nausea. Patient denies flatus and bowel movement.  Patient denies chest pain, SOB and vomiting.  Patient making adequate urine.        heparin  Injectable 5000 Unit(s) SubCutaneous every 8 hours      Vital Signs Last 24 Hrs  T(C): 37.4 (09 Feb 2019 16:39), Max: 37.4 (09 Feb 2019 16:39)  T(F): 99.4 (09 Feb 2019 16:39), Max: 99.4 (09 Feb 2019 16:39)  HR: 80 (09 Feb 2019 16:39) (75 - 92)  BP: 101/59 (09 Feb 2019 16:39) (98/48 - 144/67)  BP(mean): --  RR: 16 (09 Feb 2019 16:39) (16 - 17)  SpO2: 97% (09 Feb 2019 16:39) (96% - 100%)  I&O's Detail    08 Feb 2019 07:01  -  09 Feb 2019 07:00  --------------------------------------------------------  IN:    lactated ringers.: 1200 mL  Total IN: 1200 mL    OUT:    Indwelling Catheter - Urethral: 1715 mL  Total OUT: 1715 mL    Total NET: -515 mL      09 Feb 2019 07:01  -  09 Feb 2019 17:52  --------------------------------------------------------  IN:    lactated ringers.: 720 mL    Oral Fluid: 720 mL  Total IN: 1440 mL    OUT:    Voided: 900 mL  Total OUT: 900 mL    Total NET: 540 mL          General: NAD, resting comfortably in bed  C/V: NSR  Pulm: Nonlabored breathing, no respiratory distress  Abd: soft, mild distention , TTP around incision site , incision clean dry and intact  Extrem: WWP, no edema, SCDs in place      LABS:                        10.5   13.45 )-----------( 248      ( 09 Feb 2019 07:35 )             33.4     02-09    139  |  105  |  8   ----------------------------<  103<H>  3.7   |  27  |  0.64    Ca    9.0      09 Feb 2019 07:35  Phos  3.2     02-09  Mg     1.7     02-09            RADIOLOGY & ADDITIONAL STUDIES:
Passing flatus and BM  Tolerating clears  AFVSS  Abd soft, minimal distention, incisions clean    A/P: POD 2 lap sigmoid colectomy. Doing well.  1. HLIV  2. Low residue diet  3. OOB, IS
SUBJECTIVE: Feeling well, having bowel function, pain controlled, juaquin diet. Patient seen and examined bedside by chief resident.    heparin  Injectable 5000 Unit(s) SubCutaneous every 8 hours      Vital Signs Last 24 Hrs  T(C): 36.7 (11 Feb 2019 05:56), Max: 37.2 (10 Feb 2019 09:54)  T(F): 98 (11 Feb 2019 05:56), Max: 99 (10 Feb 2019 09:54)  HR: 78 (11 Feb 2019 05:56) (76 - 83)  BP: 138/72 (11 Feb 2019 05:56) (102/62 - 138/72)  BP(mean): --  RR: 17 (11 Feb 2019 05:56) (16 - 17)  SpO2: 96% (11 Feb 2019 05:56) (96% - 97%)  I&O's Detail    10 Feb 2019 07:01  -  11 Feb 2019 07:00  --------------------------------------------------------  IN:    Oral Fluid: 480 mL  Total IN: 480 mL    OUT:    Voided: 825 mL  Total OUT: 825 mL    Total NET: -345 mL          General: NAD, resting comfortably in bed  C/V: NSR  Pulm: Nonlabored breathing, no respiratory distress  Abd: soft, ND, ATTP around incision sites, CDI  Extrem: SCDs in place        LABS:                        9.1    7.81  )-----------( 215      ( 10 Feb 2019 06:38 )             29.5     02-10    142  |  109<H>  |  5<L>  ----------------------------<  100<H>  3.8   |  26  |  0.58    Ca    8.8      10 Feb 2019 06:38  Phos  2.6     02-10  Mg     2.2     02-10    TPro  5.3<L>  /  Alb  3.1<L>  /  TBili  0.4  /  DBili  x   /  AST  14  /  ALT  10  /  AlkPhos  51  02-10
Surgery Post-Op Note    Pre-Op Dx: DIVERTICU K57.32  Acute diverticulitis  Acute diverticulitis    Procedure: Colectomy, sigmoid, laparoscopic, hand-assisted  Indocyanine green (ICG) fluorescence angiography  Lysis of adhesions    Surgeon: Jose    Subjective:  Patient feels well. Pain well controlled on PCA  Patient denies chest pain/ shortness of breath  Patient denies nausea/vomiting.   Encouraged IS. Encouraged OOBA, PO intake when on floor.    Vital Signs Last 24 Hrs  T(C): 36.6 (08 Feb 2019 12:10), Max: 36.6 (08 Feb 2019 12:10)  T(F): 97.8 (08 Feb 2019 12:10), Max: 97.8 (08 Feb 2019 12:10)  HR: 70 (08 Feb 2019 14:00) (70 - 94)  BP: 137/55 (08 Feb 2019 14:00) (137/55 - 154/62)  BP(mean): 88 (08 Feb 2019 12:10) (85 - 101)  RR: 16 (08 Feb 2019 14:00) (12 - 18)  SpO2: 100% (08 Feb 2019 14:00) (99% - 100%)    Physical Exam:  General: NAD, Comfortable in bed , PCA in place    Neuro: A&Ox3  Respiratory: nonlabored breathing, no respiratory distress    Abd: soft, nontender, nondistended,  incisions clean dry intact, without erythema, drainage, or malodor  : dudley in place draining dark yellow urine  Extremities: WWP, nonedematous, SCDs in place            LABS:            CAPILLARY BLOOD GLUCOSE      POCT Blood Glucose.: 95 mg/dL (08 Feb 2019 06:56)          Radiology and Additional Studies:

## 2019-02-11 NOTE — DISCHARGE NOTE ADULT - MEDICATION SUMMARY - MEDICATIONS TO TAKE
I will START or STAY ON the medications listed below when I get home from the hospital:    Percocet 5/325 oral tablet  -- 1 tab(s) by mouth every 6 hours, As Needed -for severe pain MDD:4   -- Caution federal law prohibits the transfer of this drug to any person other  than the person for whom it was prescribed.  May cause drowsiness.  Alcohol may intensify this effect.  Use care when operating dangerous machinery.  This prescription cannot be refilled.  This product contains acetaminophen.  Do not use  with any other product containing acetaminophen to prevent possible liver damage.  Using more of this medication than prescribed may cause serious breathing problems.    -- Indication: For Severe pain    Paxil 20 mg oral tablet  -- 1 tab(s) by mouth once a day  -- Indication: For Home meds    Ambien 10 mg oral tablet  -- 1 tab(s) by mouth once a day (at bedtime)  -- Indication: For Home meds    Colace 100 mg oral capsule  -- 1 cap(s) by mouth 2 times a day, As Needed -for constipation   -- Medication should be taken with plenty of water.    -- Indication: For Constipation    Colace 100 mg oral capsule  -- 1 cap(s) by mouth 2 times a day, As Needed -for constipation   -- Medication should be taken with plenty of water.    -- Indication: For Constipation    estradiol 0.06 mg/24 hours weekly transdermal film, extended release  -- 1 patch by transdermal patch once a week  -- Indication: For Home meds

## 2019-02-11 NOTE — DISCHARGE NOTE ADULT - CARE PLAN
Principal Discharge DX:	Acute diverticulitis  Goal:	Recovery  Assessment and plan of treatment:	-Continue eating your low fiber diet as tolerated and drinking plenty of fluids.   -For pain, you may take over-the-counter Tylenol and/or NSAIDs (such as motrin/advil) as labeled, as needed. For breakthrough pain you may take Percocet, which contains Tylenol. Do NOT exceed 4000mg acetaminophen/Tylenol total within 24 hours. Please take Colace 1 tab twice daily while taking narcotic pain medication to avoid constipation.  -No heavy lifting >20 pounds or strenuous exercise.  -You may shower but NO baths and NO swimming. Keep your incisions clean & dry. Avoid a direct stream of water over incision sites. Do not scrub. Pat dry when done.  -Contact your doctor or go to the ER for fever > 101.5, chills, nausea, vomiting, chest pain, shortness of breath, pain not controlled by medication or excessive bleeding.  -Please follow up with Dr. Garcia in 2 weeks; you may call the office to make an appointment at your earliest convenience.

## 2019-02-11 NOTE — DISCHARGE NOTE ADULT - CARE PROVIDER_API CALL
Bronwyn Garcia)  ColonRectal Surgery; Surgery  [address] [place] [address], [address]  [place] [address]  Phone: [phone]  Fax: [phone]  Follow Up Time:

## 2019-02-11 NOTE — DISCHARGE NOTE ADULT - PATIENT PORTAL LINK FT
You can access the [facility] Patient Portal, offered by [facility], by registering with the following website: [email or web address]

## 2019-02-11 NOTE — DISCHARGE NOTE ADULT - HOSPITAL COURSE
Patient is a 68 year old female with history of acute sigmoid diverticulitis with perforation managed with IV antibiotics and IR drainage now s/p laparoscopic sigmoidectomy. Patient's post-operative course was uncomplicated. Diet was advanced as tolerated and pain was well controlled on medication. On day of discharge, pt deemed stable and ready to return home with plan to follow up as an outpatient.

## 2019-02-11 NOTE — PROGRESS NOTE ADULT - ASSESSMENT
68 F hx of acute sigmoid diverticulitis and perforation managed with IV antibiotics and IR drainage now POD2  s/p laparoscopic sigmoidectomy with primary anastomosis      -Toradol 15 mg every 6 hours for 3 days, monitor kidney function  - Advanced diet to Low residue diet  - Home medications  - Incentive Spirometry ,OOB, encourage ambulation  - SQH, SCDs for DVT prophylaxis
68 year old female with history of acute sigmoid diverticulitis with perforation managed with IV antibiotics and IR drainage now s/p laparoscopic sigmoidectomy    - Dilaudid PCA discontinued   - Started on Toradol 15 mg every 6 hours for 3 days  - F/u bowel function   , Nausea Control PRN- Home meds as appropriate  - Diet: CLD, LR @ 80, Entereg, Colace  -  One[place] dc'd  -  SQH, SCDs  - Incentive Spirometry ,OOB, ambulate as tolerated
Patient is a 68 year old female with history of acute sigmoid diverticulitis with perforation managed with IV antibiotics and IR drainage now s/p laparoscopic sigmoidectomy    - PCA, Nausea Control PRN- Home meds as appropriate  - Diet: CLD, LR @ 80, Entereg, Colace  -  Oneill   -  SQH, SCDs  - Incentive Spirometry ,OOB, ambulate as tolerated
Patient is a 68 year old female with history of acute sigmoid diverticulitis with perforation managed with IV antibiotics and IR drainage now s/p laparoscopic sigmoidectomy    - Pain/Nausea Control PRN   - Home meds as appropriate  - Diet: CLD, Entereg, Colace  - SQH, SCDs  - Incentive Spirometry ,OOB, ambulate as tolerated

## 2019-02-11 NOTE — PROGRESS NOTE ADULT - REASON FOR ADMISSION
Elective presentation for sigmoidectomy

## 2019-02-12 LAB — SURGICAL PATHOLOGY STUDY: SIGNIFICANT CHANGE UP

## 2019-02-13 DIAGNOSIS — K66.0 PERITONEAL ADHESIONS (POSTPROCEDURAL) (POSTINFECTION): ICD-10-CM

## 2019-02-13 DIAGNOSIS — K57.32 DIVERTICULITIS OF LARGE INTESTINE WITHOUT PERFORATION OR ABSCESS WITHOUT BLEEDING: ICD-10-CM

## 2019-02-13 DIAGNOSIS — F32.9 MAJOR DEPRESSIVE DISORDER, SINGLE EPISODE, UNSPECIFIED: ICD-10-CM

## 2019-02-14 ENCOUNTER — MOBILE ON CALL (OUTPATIENT)
Age: 69
End: 2019-02-14

## 2019-02-26 PROCEDURE — 86900 BLOOD TYPING SEROLOGIC ABO: CPT

## 2019-02-26 PROCEDURE — 80053 COMPREHEN METABOLIC PANEL: CPT

## 2019-02-26 PROCEDURE — 80048 BASIC METABOLIC PNL TOTAL CA: CPT

## 2019-02-26 PROCEDURE — 86901 BLOOD TYPING SEROLOGIC RH(D): CPT

## 2019-02-26 PROCEDURE — 86920 COMPATIBILITY TEST SPIN: CPT

## 2019-02-26 PROCEDURE — 86850 RBC ANTIBODY SCREEN: CPT

## 2019-02-26 PROCEDURE — 83735 ASSAY OF MAGNESIUM: CPT

## 2019-02-26 PROCEDURE — 85027 COMPLETE CBC AUTOMATED: CPT

## 2019-02-26 PROCEDURE — 36415 COLL VENOUS BLD VENIPUNCTURE: CPT

## 2019-02-26 PROCEDURE — 82962 GLUCOSE BLOOD TEST: CPT

## 2019-02-26 PROCEDURE — 88307 TISSUE EXAM BY PATHOLOGIST: CPT

## 2019-02-26 PROCEDURE — 84100 ASSAY OF PHOSPHORUS: CPT

## 2019-02-26 PROCEDURE — 86923 COMPATIBILITY TEST ELECTRIC: CPT

## 2019-04-03 NOTE — ED ADULT NURSE NOTE - CAS ELECT INFOMATION PROVIDED
Kidney disease, chronic, stage III (GFR 30-59 ml/min)  1. CKD: stage III, +/- some progression since last year, but overall relatively stable, underlying etiology likely HTN           Lab Results   Component Value Date     CREATININE 1.6 (H) 09/01/2018      Protein Creatinine Ratios: negative, good prognosis           · Prot/Creat Ratio, Ur   Date Value Ref Range Status   09/27/2017 Unable to calculate 0.00 - 0.20 Final   08/16/2017 0.06 0.00 - 0.20 Final   05/11/2017 0.04 0.00 - 0.20 Final   ·    ·    ·    Acid-Base: not an issue        Lab Results   Component Value Date      09/01/2018     K 4.5 09/01/2018     CO2 27 09/01/2018      2. HTN: Blood pressures well controlled in the office today     3. Renal osteodystrophy: last PTH negative        Lab Results   Component Value Date     PTH 22.0 09/01/2018     CALCIUM 9.8 09/01/2018     PHOS 4.6 (H) 09/01/2018         4. Anemia: borderline, no iron deficiency, will monitor, PCP for CA screening (if felt to be needed)        Lab Results   Component Value Date     HGB 11.7 (L) 09/01/2018         5. DM:  Last HbA1C, not a diabetic        Lab Results   Component Value Date     HGBA1C 5.5 04/11/2018         6. Lipid management: reviewed, PCP managing        Lab Results   Component Value Date     LDLCALC 79.4 04/11/2018         7. ESRD planing: do not anticipate a need at this time     Follow up in one year with labs twice yearly     Patient seen with Dr Douglas        DC instructions

## 2021-03-15 NOTE — PHYSICAL THERAPY INITIAL EVALUATION ADULT - DIAGNOSIS, PT EVAL
Rosy Martinez is calling to request a refill on the following medication(s):    Medication Request:  Requested Prescriptions     Pending Prescriptions Disp Refills    gabapentin (NEURONTIN) 600 MG tablet [Pharmacy Med Name: GABAPENTIN 600 MG TABLET] 90 tablet 0     Sig: TAKE ONE TABLET BY MOUTH THREE TIMES A DAY       Last Visit Date (If Applicable):  8/4/7923    Next Visit Date:    4/8/2021      Last Refill:  2/4/2021
4H: Impaired Joint Mobility, Motor Function, Muscle Performance, and Range of Motion Associated with Joint Arthroplasty

## 2021-06-17 NOTE — DISCHARGE NOTE ADULT - MEDICATION SUMMARY - MEDICATIONS TO STOP TAKING
Pt d/c to home at this time w/wife, Via wheelchair  Pt left with all belongings  Iv was D/C intact with dry sterile dressing  Encouraged to keep follow up appointments, Verbalized understanding  D/C instructions reviewed and explained  Verbalized understanding  New Rx explained for aspirin explained, Verbalized understanding  I will STOP taking the medications listed below when I get home from the hospital:  None

## 2022-04-25 NOTE — ED ADULT TRIAGE NOTE - BSA (M2)
1.68 Metronidazole Pregnancy And Lactation Text: This medication is Pregnancy Category B and considered safe during pregnancy.  It is also excreted in breast milk.

## 2023-08-21 NOTE — DISCHARGE NOTE ADULT - HOSPITAL COURSE
68 F w/ sigmoid diverticulitis and abscess s/p IR drainage. Patient's hospital course was uncomplicated. Diet was advanced as tolerated and pain was well controlled on medication. On day of discharge, pt deemed stable and ready to return home with plan to follow up as an outpatient.
A positive

## 2023-10-24 NOTE — DISCHARGE NOTE ADULT - NURSING SECTION COMPLETE
Spoke to patient on phone.  Discussed red flag symptoms for shoulder pain.   Explained that I do not have any knowledge of insurance coverage of MRI/denial of MRI for shoulder pain.  Discussed risks/benefits of MRI for shoulder pain.  Meloxicam ordered 1x daily PRN with precautions.   - Do not take with nsaids, take with food, do not use if kidney/stomach problems.    Offered appointment to discuss sling immobilization. Patient will think about it and call us if wants to schedule.  All questions answered.    Ehsan Hutchison MD   Patient/Caregiver provided printed discharge information.

## 2024-09-17 NOTE — PATIENT PROFILE ADULT - PRO INTERPRETER NEED 2

## 2024-11-04 NOTE — H&P ADULT - PROBLEM/PLAN-3
Weight Management Medical Nutrition Assessment  Cheri presented today for meal planning session. Today she weighed 179.6# which reflects a loss of 33.5# (18.7%) since last appointment with [facility] provider 8/5/24. She noted she has been getting a localized reaction (stated she made provider aware) around injection site of Zepbound, which she stated she started ~3 months ago. Another side effect she endorsed is nausea, but no vomiting, the day after injecting. Reported her first injection of 10 mg was last night. She stated the medication is helping to control her cravings. Endorsed feeling well overall. Not food-logging. Stated she has been following her current eating pattern (which is much improved since her 8/5/24 appointment with provider and for which RD commended her) noted below for about 2 months; denied feeling deprived; feels she can continue following this. Diet recall reveals likely adequate intake of kcal and protein. Encouraged exercise, eating at least 2 fruits daily, and increase fluid intake. Provide Cheri with meal planning session materials including calorie-controlled, balanced meal plan. Cheri with good understanding. No RD follow-up scheduled at this time, but she does have appointment with provider 2/3/25.         Patient seen by Medical Provider in past 6 months:  yes  Requested to schedule appointment with Medical Provider: No      Anthropometric Measurements  Start Weight (#) & Date: 195# 6/5/23 at initial appointment with provider; last weight was 213.1# 8/5/24  Current Weight (#): 179.6#  TBW % Change from start weight: 7.9%  Ideal Body Weight (#):110# (50 kg)  Goal Weight (#):150-155#  Highest: 213.1#  Lowest: 155# in 2021    Weight Loss History  Previous weight loss attempts: intermittent fasting (was eating only one meal and a snack daily), active with cheer when younger    Food and Nutrition Related History  Wake up: 7 AM   Bed Time:9-10 PM; usually up a few times per night; no  overnight-eating; does not leave bed when she wakes up, stated she anticipates her alarm going off    Food Recall  Breakfast:8 AM- protein shake (30 g protein, 160 kcal, 3 g CHO) when gets to work, sometimes may have a banana with it   Snack:may have small bag of almonds  Lunch:12-1 PM- Healthy Choice or Lean Cuisine meal (finds this satisfying and noted she sometimes cannot finish the entire meal)  Snack:almond/cashew bar ([employer])- 3 g protein  Dinner:5-6:30 PM- carlos steak ([employer] meal) to which she adds sauteed onion and peppers, Abhijit Taylor mashed potatoes OR grilled chicken bites with mashed potatoes, canned green beans; stated she usually eats the most at dinner; no dessert   Snack:tries not to and usually does not as she prefers not to eat after 8 PM      Beverages: water with flavoring- 24-30 oz; Gatorade Zero- 28, no tea or coffee; ETOH rarely  Volume of beverage intake: >=52 oz    Weekends: eats less b/c does not have protein shake then as  sleeps in   Cravings: none, but before Zepbound she did noting she craved everything and  as hungry all the time   Trouble area of day: none    Frequency of Eating out: once/month  Food restrictions: none   Cooking: self   Food Shopping: self    Physical Activity Intake  Activity: bowls weekly (in a league), was walking dogs, but not as much now d/t weather being colder, has a treadmill, but not using  Frequency:as above  Physical limitations/barriers to exercise: none     Estimated Needs  Energy  SECA: BMR:n/a      X 1.3 -1000 =  [place]  [employer] Energy Needs: BMR: 1523   1-2# loss weekly sedentary:  827-1327             1-2# loss weekly lightly active:1441-2659  Maintenance calories for sedentary activity level: 1827  Protein:60-75 g      (1.2-1.5g/kg IBW)  Fluid: >=58 oz     (35mL/kg IBW)    Nutrition Diagnosis  Yes;    Overweight/obesity  related to Excess energy intake as evidenced by  BMI more than normative standard for age and sex (obesity-grade I  30-34.9)       Nutrition Intervention    Nutrition Prescription  Calories:3454-5383  Protein:60-75 g  Fluid:>=64 oz    Meal Plan (Manny/Pro/Carb)  Provided Cheri with 9482-2903 kcal sample meal plan.     Nutrition Education:   Calorie controlled menu  Lean protein food choices  Healthy snack options  Food journaling tips      Nutrition Counseling:  Strategies: meal planning, portion sizes, healthy snack choices, hydration, fiber intake, protein intake, exercise, food journal      Monitoring and Evaluation:  Evaluation criteria:  Energy Intake  Meet protein needs  Maintain adequate hydration  Monitor weekly weight  Meal planning/preparation  Food journal   Decreased portions at mealtimes and snacks  Physical activity     Barriers to learning:none  Readiness to change: Action:  (Changing behavior)  Comprehension: very good  Expected Compliance: very good             DISPLAY PLAN FREE TEXT

## 2025-06-11 NOTE — ED ADULT NURSE NOTE - NSFALLRSKINDICATORS_ED_ALL_ED
2025    Charlene Soliz   2006        To Whom it May Concern;    Please excuse Charlene Heydi from work/school for a healthcare visit on 2025.    Sincerely,        Evangelista Neumann MD  
June 11, 2025      Charlene Soliz  [address] [facility] [address]        To Whom It May Concern:    Charlene Soliz was seen in our clinic via virtual visit. She may return to work without restrictions.      Sincerely,        Evangelista Neumann MD    Electronically signed          
no